# Patient Record
Sex: MALE | Race: WHITE | NOT HISPANIC OR LATINO | Employment: FULL TIME | URBAN - METROPOLITAN AREA
[De-identification: names, ages, dates, MRNs, and addresses within clinical notes are randomized per-mention and may not be internally consistent; named-entity substitution may affect disease eponyms.]

---

## 2019-09-11 ENCOUNTER — APPOINTMENT (EMERGENCY)
Dept: ULTRASOUND IMAGING | Facility: HOSPITAL | Age: 59
End: 2019-09-11
Payer: COMMERCIAL

## 2019-09-11 ENCOUNTER — HOSPITAL ENCOUNTER (INPATIENT)
Facility: HOSPITAL | Age: 59
LOS: 2 days | Discharge: HOME | DRG: 300 | End: 2019-09-13
Attending: INTERNAL MEDICINE | Admitting: INTERNAL MEDICINE
Payer: COMMERCIAL

## 2019-09-11 ENCOUNTER — OFFICE VISIT (OUTPATIENT)
Dept: URGENT CARE | Facility: CLINIC | Age: 59
End: 2019-09-11
Payer: COMMERCIAL

## 2019-09-11 ENCOUNTER — HOSPITAL ENCOUNTER (EMERGENCY)
Facility: HOSPITAL | Age: 59
End: 2019-09-11
Attending: EMERGENCY MEDICINE | Admitting: EMERGENCY MEDICINE
Payer: COMMERCIAL

## 2019-09-11 VITALS
BODY MASS INDEX: 30.61 KG/M2 | RESPIRATION RATE: 18 BRPM | TEMPERATURE: 97.5 F | WEIGHT: 226 LBS | DIASTOLIC BLOOD PRESSURE: 110 MMHG | SYSTOLIC BLOOD PRESSURE: 202 MMHG | OXYGEN SATURATION: 98 % | HEIGHT: 72 IN | HEART RATE: 96 BPM

## 2019-09-11 VITALS
HEART RATE: 89 BPM | OXYGEN SATURATION: 96 % | RESPIRATION RATE: 23 BRPM | TEMPERATURE: 98.2 F | SYSTOLIC BLOOD PRESSURE: 185 MMHG | DIASTOLIC BLOOD PRESSURE: 88 MMHG

## 2019-09-11 DIAGNOSIS — R03.0 ELEVATED BLOOD PRESSURE READING: ICD-10-CM

## 2019-09-11 DIAGNOSIS — E11.9 DIABETES MELLITUS (HCC): ICD-10-CM

## 2019-09-11 DIAGNOSIS — I82.409 DVT (DEEP VENOUS THROMBOSIS) (HCC): Primary | ICD-10-CM

## 2019-09-11 DIAGNOSIS — L03.115 CELLULITIS OF RIGHT LEG: ICD-10-CM

## 2019-09-11 DIAGNOSIS — M79.89 LEG SWELLING: Primary | ICD-10-CM

## 2019-09-11 DIAGNOSIS — I10 ESSENTIAL HYPERTENSION: ICD-10-CM

## 2019-09-11 DIAGNOSIS — E78.5 HYPERLIPIDEMIA, UNSPECIFIED HYPERLIPIDEMIA TYPE: ICD-10-CM

## 2019-09-11 PROBLEM — R73.09 ELEVATED GLUCOSE: Status: ACTIVE | Noted: 2019-09-11

## 2019-09-11 LAB
ANION GAP SERPL CALCULATED.3IONS-SCNC: 8 MMOL/L (ref 4–13)
APTT PPP: 27 SECONDS (ref 23–37)
APTT PPP: 28 SECONDS (ref 23–37)
ATRIAL RATE: 87 BPM
BASOPHILS # BLD AUTO: 0.05 THOUSANDS/ΜL (ref 0–0.1)
BASOPHILS NFR BLD AUTO: 1 % (ref 0–1)
BUN SERPL-MCNC: 18 MG/DL (ref 5–25)
CALCIUM SERPL-MCNC: 9.1 MG/DL (ref 8.3–10.1)
CHLORIDE SERPL-SCNC: 101 MMOL/L (ref 100–108)
CHOLEST SERPL-MCNC: 186 MG/DL (ref 50–200)
CO2 SERPL-SCNC: 26 MMOL/L (ref 21–32)
CREAT SERPL-MCNC: 0.82 MG/DL (ref 0.6–1.3)
EOSINOPHIL # BLD AUTO: 0.2 THOUSAND/ΜL (ref 0–0.61)
EOSINOPHIL NFR BLD AUTO: 3 % (ref 0–6)
ERYTHROCYTE [DISTWIDTH] IN BLOOD BY AUTOMATED COUNT: 12.2 % (ref 11.6–15.1)
EST. AVERAGE GLUCOSE BLD GHB EST-MCNC: 280 MG/DL
GFR SERPL CREATININE-BSD FRML MDRD: 97 ML/MIN/1.73SQ M
GLUCOSE SERPL-MCNC: 248 MG/DL (ref 65–140)
HBA1C MFR BLD: 11.4 % (ref 4.2–6.3)
HCT VFR BLD AUTO: 42.5 % (ref 36.5–49.3)
HDLC SERPL-MCNC: 46 MG/DL (ref 40–60)
HGB BLD-MCNC: 14.1 G/DL (ref 12–17)
IMM GRANULOCYTES # BLD AUTO: 0.02 THOUSAND/UL (ref 0–0.2)
IMM GRANULOCYTES NFR BLD AUTO: 0 % (ref 0–2)
INR PPP: 1.02 (ref 0.84–1.19)
LDLC SERPL CALC-MCNC: 105 MG/DL (ref 0–100)
LYMPHOCYTES # BLD AUTO: 1.65 THOUSANDS/ΜL (ref 0.6–4.47)
LYMPHOCYTES NFR BLD AUTO: 26 % (ref 14–44)
MCH RBC QN AUTO: 28.5 PG (ref 26.8–34.3)
MCHC RBC AUTO-ENTMCNC: 33.2 G/DL (ref 31.4–37.4)
MCV RBC AUTO: 86 FL (ref 82–98)
MONOCYTES # BLD AUTO: 0.54 THOUSAND/ΜL (ref 0.17–1.22)
MONOCYTES NFR BLD AUTO: 9 % (ref 4–12)
NEUTROPHILS # BLD AUTO: 3.8 THOUSANDS/ΜL (ref 1.85–7.62)
NEUTS SEG NFR BLD AUTO: 61 % (ref 43–75)
NONHDLC SERPL-MCNC: 140 MG/DL
NRBC BLD AUTO-RTO: 0 /100 WBCS
P AXIS: 62 DEGREES
PLATELET # BLD AUTO: 324 THOUSANDS/UL (ref 149–390)
PMV BLD AUTO: 10.2 FL (ref 8.9–12.7)
POTASSIUM SERPL-SCNC: 4.1 MMOL/L (ref 3.5–5.3)
PR INTERVAL: 144 MS
PROTHROMBIN TIME: 13.4 SECONDS (ref 11.6–14.5)
QRS AXIS: -43 DEGREES
QRSD INTERVAL: 96 MS
QT INTERVAL: 362 MS
QTC INTERVAL: 435 MS
RBC # BLD AUTO: 4.95 MILLION/UL (ref 3.88–5.62)
SODIUM SERPL-SCNC: 135 MMOL/L (ref 136–145)
T WAVE AXIS: 40 DEGREES
TRIGL SERPL-MCNC: 175 MG/DL
TSH SERPL DL<=0.05 MIU/L-ACNC: 0.96 UIU/ML (ref 0.36–3.74)
VENTRICULAR RATE: 87 BPM
WBC # BLD AUTO: 6.26 THOUSAND/UL (ref 4.31–10.16)

## 2019-09-11 PROCEDURE — 96366 THER/PROPH/DIAG IV INF ADDON: CPT

## 2019-09-11 PROCEDURE — NC001 PR NO CHARGE: Performed by: INTERNAL MEDICINE

## 2019-09-11 PROCEDURE — 85610 PROTHROMBIN TIME: CPT | Performed by: EMERGENCY MEDICINE

## 2019-09-11 PROCEDURE — 85025 COMPLETE CBC W/AUTO DIFF WBC: CPT | Performed by: EMERGENCY MEDICINE

## 2019-09-11 PROCEDURE — 85730 THROMBOPLASTIN TIME PARTIAL: CPT | Performed by: STUDENT IN AN ORGANIZED HEALTH CARE EDUCATION/TRAINING PROGRAM

## 2019-09-11 PROCEDURE — 36415 COLL VENOUS BLD VENIPUNCTURE: CPT | Performed by: EMERGENCY MEDICINE

## 2019-09-11 PROCEDURE — 96365 THER/PROPH/DIAG IV INF INIT: CPT

## 2019-09-11 PROCEDURE — 93970 EXTREMITY STUDY: CPT

## 2019-09-11 PROCEDURE — 85730 THROMBOPLASTIN TIME PARTIAL: CPT | Performed by: EMERGENCY MEDICINE

## 2019-09-11 PROCEDURE — 96376 TX/PRO/DX INJ SAME DRUG ADON: CPT

## 2019-09-11 PROCEDURE — 99285 EMERGENCY DEPT VISIT HI MDM: CPT

## 2019-09-11 PROCEDURE — G0382 LEV 3 HOSP TYPE B ED VISIT: HCPCS | Performed by: PHYSICIAN ASSISTANT

## 2019-09-11 PROCEDURE — 83036 HEMOGLOBIN GLYCOSYLATED A1C: CPT | Performed by: STUDENT IN AN ORGANIZED HEALTH CARE EDUCATION/TRAINING PROGRAM

## 2019-09-11 PROCEDURE — 93010 ELECTROCARDIOGRAM REPORT: CPT | Performed by: INTERNAL MEDICINE

## 2019-09-11 PROCEDURE — 80061 LIPID PANEL: CPT | Performed by: STUDENT IN AN ORGANIZED HEALTH CARE EDUCATION/TRAINING PROGRAM

## 2019-09-11 PROCEDURE — 99285 EMERGENCY DEPT VISIT HI MDM: CPT | Performed by: EMERGENCY MEDICINE

## 2019-09-11 PROCEDURE — 93005 ELECTROCARDIOGRAM TRACING: CPT

## 2019-09-11 PROCEDURE — 93970 EXTREMITY STUDY: CPT | Performed by: SURGERY

## 2019-09-11 PROCEDURE — 80048 BASIC METABOLIC PNL TOTAL CA: CPT | Performed by: EMERGENCY MEDICINE

## 2019-09-11 PROCEDURE — 84443 ASSAY THYROID STIM HORMONE: CPT | Performed by: STUDENT IN AN ORGANIZED HEALTH CARE EDUCATION/TRAINING PROGRAM

## 2019-09-11 RX ORDER — HEPARIN SODIUM 1000 [USP'U]/ML
4000 INJECTION, SOLUTION INTRAVENOUS; SUBCUTANEOUS AS NEEDED
Status: DISCONTINUED | OUTPATIENT
Start: 2019-09-11 | End: 2019-09-11

## 2019-09-11 RX ORDER — HEPARIN SODIUM 1000 [USP'U]/ML
8000 INJECTION, SOLUTION INTRAVENOUS; SUBCUTANEOUS AS NEEDED
Status: CANCELLED | OUTPATIENT
Start: 2019-09-11

## 2019-09-11 RX ORDER — HEPARIN SODIUM 10000 [USP'U]/100ML
3-30 INJECTION, SOLUTION INTRAVENOUS
Status: DISCONTINUED | OUTPATIENT
Start: 2019-09-11 | End: 2019-09-11

## 2019-09-11 RX ORDER — HEPARIN SODIUM 1000 [USP'U]/ML
8000 INJECTION, SOLUTION INTRAVENOUS; SUBCUTANEOUS AS NEEDED
Status: DISCONTINUED | OUTPATIENT
Start: 2019-09-11 | End: 2019-09-11

## 2019-09-11 RX ORDER — HEPARIN SODIUM 10000 [USP'U]/100ML
3-30 INJECTION, SOLUTION INTRAVENOUS
Status: CANCELLED | OUTPATIENT
Start: 2019-09-11

## 2019-09-11 RX ORDER — CEFAZOLIN SODIUM 2 G/50ML
2000 SOLUTION INTRAVENOUS EVERY 8 HOURS
Status: DISCONTINUED | OUTPATIENT
Start: 2019-09-11 | End: 2019-09-12

## 2019-09-11 RX ORDER — HEPARIN SODIUM 1000 [USP'U]/ML
4000 INJECTION, SOLUTION INTRAVENOUS; SUBCUTANEOUS AS NEEDED
Status: CANCELLED | OUTPATIENT
Start: 2019-09-11

## 2019-09-11 RX ORDER — CEPHALEXIN 250 MG/1
500 CAPSULE ORAL ONCE
Status: COMPLETED | OUTPATIENT
Start: 2019-09-11 | End: 2019-09-11

## 2019-09-11 RX ORDER — HEPARIN SODIUM 1000 [USP'U]/ML
8000 INJECTION, SOLUTION INTRAVENOUS; SUBCUTANEOUS AS NEEDED
Status: DISCONTINUED | OUTPATIENT
Start: 2019-09-11 | End: 2019-09-11 | Stop reason: HOSPADM

## 2019-09-11 RX ORDER — HEPARIN SODIUM 1000 [USP'U]/ML
4000 INJECTION, SOLUTION INTRAVENOUS; SUBCUTANEOUS AS NEEDED
Status: DISCONTINUED | OUTPATIENT
Start: 2019-09-11 | End: 2019-09-11 | Stop reason: HOSPADM

## 2019-09-11 RX ORDER — HEPARIN SODIUM 10000 [USP'U]/100ML
3-30 INJECTION, SOLUTION INTRAVENOUS
Status: DISCONTINUED | OUTPATIENT
Start: 2019-09-11 | End: 2019-09-11 | Stop reason: HOSPADM

## 2019-09-11 RX ORDER — HEPARIN SODIUM 1000 [USP'U]/ML
8000 INJECTION, SOLUTION INTRAVENOUS; SUBCUTANEOUS ONCE
Status: DISCONTINUED | OUTPATIENT
Start: 2019-09-11 | End: 2019-09-11

## 2019-09-11 RX ORDER — HEPARIN SODIUM 1000 [USP'U]/ML
8000 INJECTION, SOLUTION INTRAVENOUS; SUBCUTANEOUS ONCE
Status: COMPLETED | OUTPATIENT
Start: 2019-09-11 | End: 2019-09-11

## 2019-09-11 RX ORDER — HEPARIN SODIUM 1000 [USP'U]/ML
4000 INJECTION, SOLUTION INTRAVENOUS; SUBCUTANEOUS AS NEEDED
Status: DISCONTINUED | OUTPATIENT
Start: 2019-09-11 | End: 2019-09-13

## 2019-09-11 RX ORDER — HEPARIN SODIUM 10000 [USP'U]/100ML
3-30 INJECTION, SOLUTION INTRAVENOUS
Status: DISCONTINUED | OUTPATIENT
Start: 2019-09-11 | End: 2019-09-13

## 2019-09-11 RX ADMIN — HEPARIN SODIUM 8000 UNITS: 1000 INJECTION INTRAVENOUS; SUBCUTANEOUS at 12:58

## 2019-09-11 RX ADMIN — HEPARIN SODIUM AND DEXTROSE 18 UNITS/KG/HR: 10000; 5 INJECTION INTRAVENOUS at 13:08

## 2019-09-11 RX ADMIN — HEPARIN SODIUM 18 UNITS/KG/HR: 10000 INJECTION, SOLUTION INTRAVENOUS at 21:15

## 2019-09-11 RX ADMIN — CEFAZOLIN SODIUM 2000 MG: 2 SOLUTION INTRAVENOUS at 21:14

## 2019-09-11 RX ADMIN — CEPHALEXIN 500 MG: 250 CAPSULE ORAL at 11:06

## 2019-09-11 RX ADMIN — SODIUM CHLORIDE 1000 ML: 0.9 INJECTION, SOLUTION INTRAVENOUS at 14:51

## 2019-09-11 NOTE — EMTALA/ACUTE CARE TRANSFER
[address] [facility] [address]  [address]  [facility] [address]  Dept: [phone]      EMTALA TRANSFER CONSENT    NAME Dafne Davila                                         1960                              MRN [id number]    I have been informed of my rights regarding examination, treatment, and transfer   by Dr Mariusz Mcgarry MD    Benefits: Specialized equipment and/or services available at the receiving facility (Include comment)________________________(Need for vascular surgery and interventional radiology evaluation)    Risks: Potential for delay in receiving treatment, Potential deterioration of medical condition, Loss of IV, Possible worsening of condition or death during transfer, Increased discomfort during transfer      Transfer Request   I acknowledge that my medical condition has been evaluated and explained to me by the emergency department physician or other qualified medical person and/or my attending physician who has recommended and offered to me further medical examination and treatment  I understand the Hospital's obligation with respect to the treatment and stabilization of my emergency medical condition  I nevertheless request to be transferred  I release the Hospital, the doctor, and any other persons caring for me from all responsibility or liability for any injury or ill effects that may result from my transfer and agree to accept all responsibility for the consequences of my choice to transfer, rather than receive stabilizing treatment at the Hospital  I understand that because the transfer is my request, my insurance may not provide reimbursement for the services  The Hospital will assist and direct me and my family in how to make arrangements for transfer, but the hospital is not liable for any fees charged by the transport service    In spite of this understanding, I refuse to consent to further medical examination and treatment which has been offered to me, and request transfer to [address] Name, [address] : [address]  I authorize the performance of emergency medical procedures and treatments upon me in both transit and upon arrival at the receiving facility  Additionally, I authorize the release of any and all medical records to the receiving facility and request they be transported with me, if possible  I authorize the performance of emergency medical procedures and treatments upon me in both transit and upon arrival at the receiving facility  Additionally, I authorize the release of any and all medical records to the receiving facility and request they be transported with me, if possible  I understand that the safest mode of transportation during a medical emergency is an ambulance and that the Hospital advocates the use of this mode of transport  Risks of traveling to the receiving facility by car, including absence of medical control, life sustaining equipment, such as oxygen, and medical personnel has been explained to me and I fully understand them  (THERESE CORRECT BOX BELOW)  [  ]  I consent to the stated transfer and to be transported by ambulance/helicopter  [  ]  I consent to the stated transfer, but refuse transportation by ambulance and accept full responsibility for my transportation by car  I understand the risks of non-ambulance transfers and I exonerate the Hospital and its staff from any deterioration in my condition that results from this refusal     X___________________________________________    DATE  19  TIME________  Signature of patient or legally responsible individual signing on patient behalf           RELATIONSHIP TO PATIENT_________________________          Provider Certification    NAME [id number] 1960                              MRN [id number]    A medical screening exam was performed on the above named patient    Based on the examination:    Condition Necessitating Transfer The encounter diagnosis was DVT (deep venous thrombosis) ([facility] [address] )  Patient Condition: The patient has been stabilized such that within reasonable medical probability, no material deterioration of the patient condition or the condition of the unborn child(raudel) is likely to result from the transfer    Reason for Transfer: Level of Care needed not available at this facility    Transfer Requirements: [address]   · Space available and qualified personnel available for treatment as acknowledged by    · Agreed to accept transfer and to provide appropriate medical treatment as acknowledged by       Dr Natalie Weeks  · Appropriate medical records of the examination and treatment of the patient are provided at the time of transfer   [address] _______  · Transfer will be performed by qualified personnel from    and appropriate transfer equipment as required, including the use of necessary and appropriate life support measures      Provider Certification: I have examined the patient and explained the following risks and benefits of being transferred/refusing transfer to the patient/family:  General risk, such as traffic hazards, adverse weather conditions, rough terrain or turbulence, possible failure of equipment (including vehicle or aircraft), or consequences of actions of persons outside the control of the transport personnel, Unanticipated needs of medical equipment and personnel during transport, Risk of worsening condition      Based on these reasonable risks and benefits to the patient and/or the unborn child(raudel), and based upon the information available at the time of the patients examination, I certify that the medical benefits reasonably to be expected from the provision of appropriate medical treatments at another medical facility outweigh the increasing risks, if any, to the individuals medical condition, and in the case of labor to the unborn child, from effecting the transfer      X____________________________________________ DATE 09/11/19        TIME_______      ORIGINAL - SEND TO MEDICAL RECORDS   COPY - SEND WITH PATIENT DURING TRANSFER

## 2019-09-11 NOTE — ED PROVIDER NOTES
History  Chief Complaint   Patient presents with    Leg Swelling     Pt sent via urgent care for r/o DVT  Pt presents with right leg swelling, wound, and petichea  Pt reports recont travel to [facility]  HPI     28-year-old previously healthy male presenting for evaluation of swelling in his right lower extremity  Patient states that he sustained a wound to his right anterior shin 3 weeks ago while mowing the lawn  Was prescribed a Z-Sammy and an urgent care, he took this but states that the wound has remained  Reports a mild area of erythema surrounding it, with some small locations of erythema to the right posterior leg  He traveled to [facility] last week, return to week ago, then 3 days later developed swelling in his right lower extremity below the knee  No history of DVT or PE  Denies chest pain or shortness of breath  Swelling is nonpainful  Denies fevers or chills  No drainage from the wound to his leg  Additionally, patient states his blood pressure is elevated to 209/95 here today  He denies headache, vision changes, decreased urine output, chest pain, shortness of breath  Dizziness  No history of hypertension  None       History reviewed  No pertinent past medical history  History reviewed  No pertinent surgical history  History reviewed  No pertinent family history  I have reviewed and agree with the history as documented  Social History     Tobacco Use    Smoking status: Former Smoker    Smokeless tobacco: Never Used   Substance Use Topics    Alcohol use: Not Currently    Drug use: Not Currently        Review of Systems   Constitutional: Negative for chills and fever  HENT: Negative for congestion  Eyes: Negative for visual disturbance  Respiratory: Negative for cough and shortness of breath  Cardiovascular: Positive for leg swelling  Negative for chest pain  Gastrointestinal: Negative for abdominal pain, diarrhea, nausea and vomiting     Genitourinary: Negative for decreased urine volume, difficulty urinating, dysuria and frequency  Musculoskeletal: Negative for arthralgias, back pain, neck pain and neck stiffness  Skin: Positive for wound  Negative for rash  Neurological: Negative for weakness, numbness and headaches  Psychiatric/Behavioral: Negative for agitation, behavioral problems and confusion  Physical Exam  Physical Exam   Constitutional: He is oriented to person, place, and time  He appears well-developed and well-nourished  No distress  HENT:   Head: Normocephalic and atraumatic  Right Ear: External ear normal    Left Ear: External ear normal    Nose: Nose normal    Mouth/Throat: Oropharynx is clear and moist    Eyes: Conjunctivae are normal    Neck: Normal range of motion  Neck supple  Cardiovascular: Normal rate, regular rhythm, normal heart sounds and intact distal pulses  Exam reveals no gallop and no friction rub  No murmur heard  Pulmonary/Chest: Effort normal and breath sounds normal  No respiratory distress  He has no wheezes  He has no rales  Abdominal: Soft  Bowel sounds are normal  He exhibits no distension  There is no tenderness  There is no guarding  Musculoskeletal: Normal range of motion  He exhibits edema (2+ non-pitting edema to the RLE below the knee  No associated TTP)  He exhibits no deformity  Neurological: He is alert and oriented to person, place, and time  He exhibits normal muscle tone  Skin: Skin is warm and dry  He is not diaphoretic  Scab to the R anterior shin, with quarter-sized area of surrounding erythema   Small raised erythematous lesions to the R posterior thigh, non-tender       Vital Signs  ED Triage Vitals [09/11/19 1009]   Temperature Pulse Respirations Blood Pressure SpO2   98 2 °F (36 8 °C) 104 19 (!) 209/95 96 %      Temp Source Heart Rate Source Patient Position - Orthostatic VS BP Location FiO2 (%)   Oral Monitor Lying Left arm --      Pain Score       No Pain           Vitals:    09/11/19 1309 09/11/19 1430 09/11/19 1730 09/11/19 1800   BP: (!) 178/82 162/81 162/80 (!) 185/88   Pulse: 85 80 80 89   Patient Position - Orthostatic VS: Sitting Sitting Sitting Sitting         Visual Acuity      ED Medications  Medications   heparin (porcine) 25,000 units in 250 mL infusion (premix) (18 Units/kg/hr × 100 kg (Order-Specific) Intravenous New Bag 9/11/19 1308)   heparin (porcine) injection 8,000 Units (has no administration in time range)   heparin (porcine) injection 4,000 Units (has no administration in time range)   cephalexin (KEFLEX) capsule 500 mg (500 mg Oral Given 9/11/19 1106)   heparin (porcine) injection 8,000 Units (8,000 Units Intravenous Given 9/11/19 1258)   sodium chloride 0 9 % bolus 1,000 mL (1,000 mL Intravenous New Bag 9/11/19 1451)       Diagnostic Studies  Results Reviewed     Procedure Component Value Units Date/Time    APTT [[id number]]  (Normal) Collected:  09/11/19 1143    Lab Status:  Final result Specimen:  Blood from Arm, Right Updated:  09/11/19 1255     PTT 28 seconds     APTT [[id number]]     Lab Status:  No result Specimen:  Blood     Protime-INR [[id number]]  (Normal) Collected:  09/11/19 1143    Lab Status:  Final result Specimen:  Blood from Arm, Right Updated:  09/11/19 1200     Protime 13 4 seconds      INR 4 37    Basic metabolic panel [[id number]]  (Abnormal) Collected:  09/11/19 1109    Lab Status:  Final result Specimen:  Blood from Arm, Left Updated:  09/11/19 1126     Sodium 135 mmol/L      Potassium 4 1 mmol/L      Chloride 101 mmol/L      CO2 26 mmol/L      ANION GAP 8 mmol/L      BUN 18 mg/dL      Creatinine 0 82 mg/dL      Glucose 248 mg/dL      Calcium 9 1 mg/dL      eGFR 97 ml/min/1 73sq m     Narrative:       [place] guidelines for Chronic Kidney Disease (CKD):     Stage 1 with normal or high GFR (GFR > 90 mL/min/1 73 square meters)    Stage 2 Mild CKD (GFR = 60-89 mL/min/1 73 square meters)    Stage 3A Moderate CKD (GFR = 45-59 mL/min/1 73 square meters)    Stage 3B Moderate CKD (GFR = 30-44 mL/min/1 73 square meters)    Stage 4 Severe CKD (GFR = 15-29 mL/min/1 73 square meters)    Stage 5 End Stage CKD (GFR <15 mL/min/1 73 square meters)  Note: GFR calculation is accurate only with a steady state creatinine    CBC and differential [[id number]] Collected:  09/11/19 1109    Lab Status:  Final result Specimen:  Blood from Arm, Left Updated:  09/11/19 1117     WBC 6 26 Thousand/uL      RBC 4 95 Million/uL      Hemoglobin 14 1 g/dL      Hematocrit 42 5 %      MCV 86 fL      MCH 28 5 pg      MCHC 33 2 g/dL      RDW 12 2 %      MPV 10 2 fL      Platelets 699 Thousands/uL      nRBC 0 /100 WBCs      Neutrophils Relative 61 %      Immat GRANS % 0 %      Lymphocytes Relative 26 %      Monocytes Relative 9 %      Eosinophils Relative 3 %      Basophils Relative 1 %      Neutrophils Absolute 3 80 Thousands/µL      Immature Grans Absolute 0 02 Thousand/uL      Lymphocytes Absolute 1 65 Thousands/µL      Monocytes Absolute 0 54 Thousand/µL      Eosinophils Absolute 0 20 Thousand/µL      Basophils Absolute 0 05 Thousands/µL                  VAS lower limb venous duplex study, complete bilateral    (Results Pending)              Procedures  Procedures       ED Course                               MDM  Number of Diagnoses or Management Options  DVT (deep venous thrombosis) ([facility] [address] ): new and requires workup  Diagnosis management comments:   Generally well appearing  Afebrile and hemodynamically stable  Patient is hypertensive but denies symptoms that would be consistent with hypertensive crisis  No evidence of end-organ damage on labs  Patient is found to have a DVT extending from his right common femoral throughout the entirety of the right lower extremity venous system  Patient has good distal pulses  The area is not particularly tender  Case discussed with Dr Long Golden with vascular surgery    Recommends transfer to [facility] for vascular surgery and IR evaluation for likely intravascular tPA  Recommends admission to Medicine Service  Patient was started on heparin drip here  He was transferred in stable condition  Patient otherwise incidentally noted to have a blood glucose of 248  He does not have a known history of diabetes  Will likely need to be started on anti hyperglycemic agents  1 L of fluid given here  Amount and/or Complexity of Data Reviewed  Clinical lab tests: ordered and reviewed  Tests in the radiology section of CPT®: ordered and reviewed  Discuss the patient with other providers: yes    Patient Progress  Patient progress: stable         Disposition  Final diagnoses:   DVT (deep venous thrombosis) ([facility] [address] )     Time reflects when diagnosis was documented in both MDM as applicable and the Disposition within this note     Time User Action Codes Description Comment    9/11/2019  2:54 PM Lakisha Marcos Add [I82 [id number]] DVT (deep venous thrombosis) [facility])       ED Disposition     ED Disposition Condition Date/Time Comment    Transfer to Another Facility-In Network  Wed Sep 11, 2019  2:54 PM Magalis Held should be transferred out to [facility]          MD Documentation      Most Recent Value   Patient Condition  The patient has been stabilized such that within reasonable medical probability, no material deterioration of the patient condition or the condition of the unborn child(raudel) is likely to result from the transfer   Reason for Transfer  Level of Care needed not available at this facility   Benefits of Transfer  Specialized equipment and/or services available at the receiving facility (Include comment)________________________ [facility] for vascular surgery and interventional radiology evaluation]   Risks of Transfer  Potential for delay in receiving treatment, Potential deterioration of medical condition, Loss of IV, Possible worsening of condition or death during transfer, Increased discomfort during transfer   Accepting Physician  Dr Debbie Meredith Name, [facility] [place]   Sending MD Dr Estephania Wang   Provider Certification  General risk, such as traffic hazards, adverse weather conditions, rough terrain or turbulence, possible failure of equipment (including vehicle or aircraft), or consequences of actions of persons outside the control of the transport personnel, Unanticipated needs of medical equipment and personnel during transport, Risk of worsening condition      RN Documentation      Most [facility] Name, [facility] [place]   Level of Care  Basic life support      Follow-up Information    None         There are no discharge medications for this patient  No discharge procedures on file      ED Provider  Electronically Signed by           Dana Conn MD  09/11/19 9945

## 2019-09-11 NOTE — PATIENT INSTRUCTIONS
Due to elevated blood pressure, leg swelling concerned for blood clot with recent travel  Recommended emergency room evaluation  I called the ER

## 2019-09-11 NOTE — H&P
INTERNAL MEDICINE HISTORY AND PHYSICAL  [facility] [address] {SOD [id number]}    NAME: Estela Brennan  AGE: 63 y o  SEX: male  : 1960   MRN: [id number]  ENCOUNTER: [id number]    DATE: 2019  TIME: 4:25 PM    Primary Care Physician: No primary care provider on file  Admitting Provider: Cheli Mohamud MD    Chief complaint: ***    History of Present illness  This is a 71-year-old male with past medical history significant for previous tobacco abuse who presented on 2019 to [facility] secondary to increased leg swelling  Patient reportedly had a recent flight of prolonged duration and subsequently developed right lower extremity swelling after that  Patient reportedly had no history of DVT or PE, did not have any chest pain or shortness of breath  On presentation to emergency department patient was noted to have substantial swelling in his right lower extremity, as well as mild erythematous area around a wound on his right lower extremity that reportedly occurred after a   accident recently  Patient's vital signs on presentation were pulse rate of 104, respirations 19, blood pressure 209/95, O2 sat 96% and temperature 98 2°  Patient had a ultrasound of his lower extremities that was notable for extensive acute occlusive deep vein thrombus throughout the entire venous system in the common femoral vein deep femoral vein popliteal vein peroneal and posterior tibial vein  Patient had no symptoms of shortness of breath or chest pain so a CTA PE study was not obtained  Patient was started on a heparin drip as well as Keflex for his wound on his lower extremity  In [facility] was called for transfer as patient will likely need a IR vascular consult  Of note patient's glucose on BMP was 248 patient likely has new onset type 2 diabetes as well      On questioning today ***    Review of Systems        Review of Systems    Past Medical History     No past medical history on file     Past Surgical History     No past surgical history on file  Social History     Social History     Substance and Sexual Activity   Alcohol Use Not Currently     Social History     Substance and Sexual Activity   Drug Use Not Currently     Social History     Tobacco Use   Smoking Status Former Smoker   Smokeless Tobacco Never Used       Family History     No family history on file  Medications Prior to Admission     Prior to Admission medications    Not on File       Allergies     No Known Allergies    Objective     There were no vitals filed for this visit  There is no height or weight on file to calculate BMI  No intake or output data in the 24 hours ending 09/11/19 1625  Invasive Devices     Peripheral Intravenous Line            Peripheral IV 09/11/19 Left Forearm less than 1 day                Physical Exam  GENERAL: Appears well-developed and well-nourished  Appears in no acute distress   HEENT: Normocephalic and atraumatic  No scleral icterus  EOMI B/L  No oropharyngeal edema  MM moist    NECK: Neck supple with no lymphadenopathy  Trachea midline  No JVD  CARDIOVASCULAR: S1 and S2 are present  Regular rate and rhythm  No murmurs, rubs, or gallops  RESPIRATORY: CTA B/L, no rales, rhonci or wheezes  Normal respiratory expansion  ABDOMINAL: Abdomen soft, non tender, non distended   EXTREMITIES: ROM intact  No edema noted  MUSCULOSKELETAL: No joint tenderness, deformity or swelling, full range of motion without pain  NEUROLOGIC: Patient is alert and oriented to person, place, and time  No sensory or motor deficits  CN 2-12 intact  Speech fluent  SKIN: Skin is warm and dry  No rashes noted on exposed skin      PSYCHIATRIC: Normal mood and affect     Lab Results:   Results from last 7 days   Lab Units 09/11/19  1109   WBC Thousand/uL 6 26   HEMOGLOBIN g/dL 14 1   HEMATOCRIT % 42 5   PLATELETS Thousands/uL 324   NEUTROS PCT % 61   MONOS PCT % 9      Results from last 7 days   Lab Units 09/11/19  1143 09/11/19  1109   SODIUM mmol/L  --  135*   POTASSIUM mmol/L  --  4 1   CHLORIDE mmol/L  --  101   CO2 mmol/L  --  26   BUN mg/dL  --  18   CREATININE mg/dL  --  0 82   CALCIUM mg/dL  --  9 1   INR  1 02  --    PTT seconds 28  --    EGFR ml/min/1 73sq m  --  97     Results from last 7 days   Lab Units 09/11/19  1143   INR  1 02   PTT seconds 28             No results found for: PHART, IEZ9PLE, PO2ART, FKC2TYP, A5TNCEXV, BEART, SOURCE  No components found for: HIV1X2  No results found for: HAV, HEPAIGM, HEPBIGM, HEPBCAB, HBEAG, HEPCAB  No results found for: SPEP, UPEP No results found for: HGBA1C  No results found for: CHOL No results found for: HDL No results found for: LDLCALC No results found for: TRIG  No results found for: JXO2ZXAPGJDO, T3FREE, L6LHDYS, FREET4    Imaging:   No orders to display       Microbiology:      Urinalysis:      No results found for: AMPHETUR, BDZUR, COCAINEUR, OPIATEUR, PCPUR, THCUR, ETOH, ACTMNPHEN, SALICYLATE    Urine Micro:        EKG, Pathology, and Other Studies: I have personally reviewed pertinent reports  Medications Given in Emergency Department         Assessment and Plan           1  DVT - appears to be provoked given the fact that the patient had a long flight  Substantial occlusive disease throughout the left lower extremity  Will need vascular an IR consult  · Obtain vascular and IR consult  · Continue heparin drip  · Patient develops shortness of breath low threshold to CTA  · Pulses currently will need neurovascular checks  2   Hyperglycemia- likely new onset type 2 diabetes  · Will need A1c  · Start on basal bolus insulin with correction scale  3   Cellulitis - noted on lower extremity  ·  Continue cephalexin PO    4  ***  ·  ***  5  ***  ·  ***  6  ***  ·  ***  7  ***  ·  ***  8  ***  ·  ***  9  ***  ·  ***  10  ***  ·  ***  Code Status: {IMRCODE:62224::"Level 1 - Full Code"}  VTE Pharmacologic Prophylaxis: {Pharmacologic VTE Prophylaxis:145760207}   VTE Mechanical Prophylaxis: {Mechanical VTE Prophylaxis:52445}  Admission Status: {Admission Status:02052}  Expected length of stay: ***  Admission Time  I spent {Time; 15 min - 1 hour:19605} admitting the patient  This involved direct patient contact where I performed a full history and physical, reviewing previous records, and reviewing laboratory and other diagnostic studies  PLEASE NOTE:  This encounter was completed utilizing the [employer] Direct Speech Voice Recognition Software  Grammatical errors, random word insertions, pronoun errors and incomplete sentences are occasional consequences of the system due to software limitations, ambient noise and hardware issues  These may be missed by proof reading prior to affixing electronic signature  Any questions or concerns about the content, text or information contained within the body of this dictation should be directly addressed to the physician for clarification  Please do not hesitate to call me directly if you have any any questions or concerns  Dustin Serrano, DO     Internal Medicine  PGY 1

## 2019-09-11 NOTE — PROGRESS NOTES
[address] Now        NAME: Jen Diamond is a 63 y o  male  : 1960    MRN: [id number]  DATE: 2019  TIME: 9:55 AM    Assessment and Plan   Leg swelling [M79 [id number]]  1  Leg swelling  Transfer to other facility   2  Essential hypertension           Patient Instructions     Patient Instructions     Due to elevated blood pressure, leg swelling concerned for blood clot with recent travel  Recommended emergency room evaluation  I called the ER  Follow up with PCP in 3-5 days  Proceed to  ER if symptoms worsen  Chief Complaint     Chief Complaint   Patient presents with    Wound Infection     x 3 weeks,  accident, 2 wounds right leg    Leg Swelling     right leg x 3 days    Rash     red rounds spots x 1 day, see note         History of Present Illness         60-year-old male complains of right leg swelling and pain and rash for 1 week  He says 1 week ago he was in [place] and he had some gravel shoot up to his shin causing 2 small wounds  He has some redness and rash in the back  Swelling  Some pain in the leg  No fever  Review of Systems   Review of Systems   Constitutional: Negative  HENT: Negative  Eyes: Negative  Respiratory: Negative for cough and shortness of breath  Cardiovascular: Negative for chest pain  Gastrointestinal: Negative  Skin: Positive for rash  Current Medications     No current outpatient medications on file  Current Allergies     Allergies as of 2019    (No Known Allergies)            The following portions of the patient's history were reviewed and updated as appropriate: allergies, current medications, past family history, past medical history, past social history, past surgical history and problem list      History reviewed  No pertinent past medical history  History reviewed  No pertinent surgical history  No family history on file  Medications have been verified          Objective BP (!) 202/110 (BP Location: Left arm, Patient Position: Sitting, Cuff Size: Standard)   Pulse 96   Temp 97 5 °F (36 4 °C) (Temporal)   Resp 18   Ht 6' (1 829 m)   Wt 103 kg (226 lb)   SpO2 98%   BMI 30 65 kg/m²        Physical Exam     Physical Exam   Constitutional: He appears well-developed and well-nourished  No distress  HENT:   Head: Normocephalic and atraumatic  Right Ear: External ear normal    Left Ear: External ear normal    Mouth/Throat: Oropharynx is clear and moist    Eyes: Pupils are equal, round, and reactive to light  Conjunctivae and EOM are normal    Cardiovascular: Normal rate and regular rhythm  Pulmonary/Chest: Effort normal and breath sounds normal    Skin:     Right lower leg shin with 2 1 cm discoid lesions  These have some granulation tissue  There are red petechiae on the back of the right leg  He has 2 small umbilicated 3-4 mm lesions on the right distal thigh  Right calf is moderately swollen and indurated  Nontender  Negative Homans  No erythema surrounding the wounds

## 2019-09-11 NOTE — PROGRESS NOTES
Pt  States that he had a  accident 3 weeks ago and ended up with 2 open wounds on his right leg that became infected  Pt  States that he was over in [place] and was put on a z-pack and finished about 7 days ago  Pt  States that his leg started to swell 3 days ago and a rash appeared

## 2019-09-12 PROBLEM — E11.9 DIABETES MELLITUS (HCC): Status: ACTIVE | Noted: 2019-09-12

## 2019-09-12 PROBLEM — E78.5 HYPERLIPIDEMIA: Status: ACTIVE | Noted: 2019-09-12

## 2019-09-12 LAB
ANION GAP SERPL CALCULATED.3IONS-SCNC: 7 MMOL/L (ref 4–13)
APTT PPP: 54 SECONDS (ref 23–37)
APTT PPP: 59 SECONDS (ref 23–37)
APTT PPP: 66 SECONDS (ref 23–37)
ATRIAL RATE: 81 BPM
BUN SERPL-MCNC: 10 MG/DL (ref 5–25)
CALCIUM SERPL-MCNC: 9 MG/DL (ref 8.3–10.1)
CHLORIDE SERPL-SCNC: 110 MMOL/L (ref 100–108)
CO2 SERPL-SCNC: 23 MMOL/L (ref 21–32)
CREAT SERPL-MCNC: 0.76 MG/DL (ref 0.6–1.3)
ERYTHROCYTE [DISTWIDTH] IN BLOOD BY AUTOMATED COUNT: 12.2 % (ref 11.6–15.1)
GFR SERPL CREATININE-BSD FRML MDRD: 101 ML/MIN/1.73SQ M
GLUCOSE SERPL-MCNC: 159 MG/DL (ref 65–140)
GLUCOSE SERPL-MCNC: 164 MG/DL (ref 65–140)
GLUCOSE SERPL-MCNC: 218 MG/DL (ref 65–140)
HCT VFR BLD AUTO: 40.7 % (ref 36.5–49.3)
HGB BLD-MCNC: 13.5 G/DL (ref 12–17)
MCH RBC QN AUTO: 28.5 PG (ref 26.8–34.3)
MCHC RBC AUTO-ENTMCNC: 33.2 G/DL (ref 31.4–37.4)
MCV RBC AUTO: 86 FL (ref 82–98)
P AXIS: 55 DEGREES
PLATELET # BLD AUTO: 311 THOUSANDS/UL (ref 149–390)
PMV BLD AUTO: 10.6 FL (ref 8.9–12.7)
POTASSIUM SERPL-SCNC: 3.9 MMOL/L (ref 3.5–5.3)
PR INTERVAL: 148 MS
QRS AXIS: -40 DEGREES
QRSD INTERVAL: 92 MS
QT INTERVAL: 370 MS
QTC INTERVAL: 429 MS
RBC # BLD AUTO: 4.73 MILLION/UL (ref 3.88–5.62)
SODIUM SERPL-SCNC: 140 MMOL/L (ref 136–145)
T WAVE AXIS: 24 DEGREES
VENTRICULAR RATE: 81 BPM
WBC # BLD AUTO: 7.98 THOUSAND/UL (ref 4.31–10.16)

## 2019-09-12 PROCEDURE — 82948 REAGENT STRIP/BLOOD GLUCOSE: CPT

## 2019-09-12 PROCEDURE — 99223 1ST HOSP IP/OBS HIGH 75: CPT | Performed by: INTERNAL MEDICINE

## 2019-09-12 PROCEDURE — 85730 THROMBOPLASTIN TIME PARTIAL: CPT | Performed by: INTERNAL MEDICINE

## 2019-09-12 PROCEDURE — 99221 1ST HOSP IP/OBS SF/LOW 40: CPT | Performed by: SURGERY

## 2019-09-12 PROCEDURE — 93010 ELECTROCARDIOGRAM REPORT: CPT | Performed by: INTERNAL MEDICINE

## 2019-09-12 PROCEDURE — 80048 BASIC METABOLIC PNL TOTAL CA: CPT | Performed by: INTERNAL MEDICINE

## 2019-09-12 PROCEDURE — 85027 COMPLETE CBC AUTOMATED: CPT | Performed by: INTERNAL MEDICINE

## 2019-09-12 PROCEDURE — NC001 PR NO CHARGE: Performed by: INTERNAL MEDICINE

## 2019-09-12 RX ORDER — LISINOPRIL 10 MG/1
10 TABLET ORAL DAILY
Status: DISCONTINUED | OUTPATIENT
Start: 2019-09-12 | End: 2019-09-12

## 2019-09-12 RX ORDER — INSULIN GLARGINE 100 [IU]/ML
20 INJECTION, SOLUTION SUBCUTANEOUS
Qty: 600 UNITS | Refills: 0 | Status: SHIPPED | OUTPATIENT
Start: 2019-09-12 | End: 2019-09-13 | Stop reason: HOSPADM

## 2019-09-12 RX ORDER — ATORVASTATIN CALCIUM 20 MG/1
20 TABLET, FILM COATED ORAL
Status: DISCONTINUED | OUTPATIENT
Start: 2019-09-12 | End: 2019-09-12

## 2019-09-12 RX ORDER — ATORVASTATIN CALCIUM 40 MG/1
40 TABLET, FILM COATED ORAL
Status: DISCONTINUED | OUTPATIENT
Start: 2019-09-12 | End: 2019-09-12

## 2019-09-12 RX ORDER — CEPHALEXIN 500 MG/1
500 CAPSULE ORAL EVERY 6 HOURS SCHEDULED
Status: DISCONTINUED | OUTPATIENT
Start: 2019-09-12 | End: 2019-09-13 | Stop reason: HOSPADM

## 2019-09-12 RX ORDER — INSULIN GLARGINE 100 [IU]/ML
20 INJECTION, SOLUTION SUBCUTANEOUS
Status: DISCONTINUED | OUTPATIENT
Start: 2019-09-12 | End: 2019-09-13 | Stop reason: HOSPADM

## 2019-09-12 RX ADMIN — INSULIN LISPRO 2 UNITS: 100 INJECTION, SOLUTION INTRAVENOUS; SUBCUTANEOUS at 17:49

## 2019-09-12 RX ADMIN — HEPARIN SODIUM 20 UNITS/KG/HR: 10000 INJECTION, SOLUTION INTRAVENOUS at 08:04

## 2019-09-12 RX ADMIN — CEPHALEXIN 500 MG: 500 CAPSULE ORAL at 11:13

## 2019-09-12 RX ADMIN — INSULIN LISPRO 1 UNITS: 100 INJECTION, SOLUTION INTRAVENOUS; SUBCUTANEOUS at 21:52

## 2019-09-12 RX ADMIN — HEPARIN SODIUM 4000 UNITS: 1000 INJECTION, SOLUTION INTRAVENOUS; SUBCUTANEOUS at 03:35

## 2019-09-12 RX ADMIN — INSULIN GLARGINE 20 UNITS: 100 INJECTION, SOLUTION SUBCUTANEOUS at 21:50

## 2019-09-12 RX ADMIN — HEPARIN SODIUM 22 UNITS/KG/HR: 10000 INJECTION, SOLUTION INTRAVENOUS at 20:48

## 2019-09-12 RX ADMIN — CEPHALEXIN 500 MG: 500 CAPSULE ORAL at 17:48

## 2019-09-12 RX ADMIN — CEFAZOLIN SODIUM 2000 MG: 2 SOLUTION INTRAVENOUS at 04:43

## 2019-09-12 RX ADMIN — HEPARIN SODIUM 4000 UNITS: 1000 INJECTION, SOLUTION INTRAVENOUS; SUBCUTANEOUS at 18:04

## 2019-09-12 NOTE — NURSING NOTE
Spoke to Jazmine with [facility]  Patient does not need to be level 2 stepdown at this time; med surg telemetry is adequate  Patient is still on waiting list to receive telemetry box  Dr Salinas Said also aware that reinitiation PTT was subtherapeutic at 27  No changes to heparin gtt at this time  Will follow protocol and recheck PTT at 0300 as per protocol

## 2019-09-12 NOTE — PROGRESS NOTES
[facility] - Internal Medicine Progress Note  Unit/Bed#: PPHP 820-01 Encounter: [id number]  [facility] Team A      Salma Morrow 63 y o  male [id number]  Hospital Stay Days: 1    Assessment and Plan      Current Problem List   Principal Problem:    DVT (deep venous thrombosis) (HCC)  Active Problems:    Elevated glucose    Cellulitis of right lower extremity    Elevated blood pressure reading    Hyperlipidemia    Diabetes mellitus ([address] )    Assessment/Plan:    Deep vein thrombosis:  Acute onset right lower extremity edema/erythema following prolonged flight back from [facility]  U/S notable for extensive acute occlusion DVT throughout the entire venous system in the at the common for moral vein/deep for more of an/popliteal vein/peroneal vein/posterior tibial vein  No history of DVT/PE  Increased circumference right lower extremity  Denies any symptoms  Afebrile, oxygenating well room air     -vascular surgery recommendations; compression in dressing on right lower extremity, elevate right lower extremity, discussed with case management for Xarelto versus Eliquis   -advance diet today, ambulate as tolerated  -continue heparin drip to therapeutic range,  switching over to PO Anticoagulation  -continue to monitor vitals, clinical signs of PE    Cellulitis of right lower extremity:  Most likely due to  accident 2-3 weeks ago, trauma to right shin, patchy macular erythema, has not worsened past demarcated borders, afebrile, WBC 7 98  -continue Ancef, will switched to p o  Keflex 500 q 6h for 7 days    Diabetes mellitus:  Elevated glucose  Hemoglobin A1c 11 4  Last glucose 164, in-hospital range of 140-180   -will start Lantus 20 q h s   -will start metformin/insulin regimen on discharge with outpatient follow-up for chronic diseases    Hyperlipidemia:  Lipid panel total 186, , triglycerides 175, HDL 46, ASCVD risk 16 5%  -high-intensity statin therapy; atorvastatin 40 mg q day    Hypertension:   On admission 147/115, has improved 139/86, no home medications  -start lisinopril 10 mg q day    Disposition:  Discussion with case management for Xarelto versus Eliquis coverage through the patient's insurance  Subjective     Patient seen examined at bedside this morning  Patient has no overnight events, no complaints  Has been on heparin drip and cefazolin IV  Denies any fever/chills, shortness of breath, chest pain, dizziness, cough, nausea/vomiting/diarrhea  Objective     Vitals: Temp (24hrs), Av 2 °F (36 8 °C), Min:97 5 °F (36 4 °C), Max:98 9 °F (37 2 °C)  Current: Temperature: 98 6 °F (37 °C)  Patient Vitals for the past 24 hrs:   BP Temp Pulse Resp SpO2 Height Weight   19 0729 145/90 98 6 °F (37 °C) 75 16 96 % -- --   19 2319 139/86 98 9 °F (37 2 °C) 77 18 95 % -- --   19 1954 150/100 -- -- -- -- 5' 9" (1 753 m) 103 kg (226 lb)   19 1947 (!) 147/115 98 °F (36 7 °C) 87 18 96 % -- --    Body mass index is 33 37 kg/m²  Physical Exam:  GENERAL: NAD  HEENT:  NC/AT, PERRL, EOMI, no scleral icterus  CARDIAC:  RRR, +S1/S2, no S3/S4 heard, no m/g/r  PULMONARY:  CTA B/L, no wheezing/rales/rhonci, non-labored breathing  ABDOMEN:  Soft, NT/ND,no rebound/guarding/rigidity  Extremities:   Right calf circumference slightly larger than left, no cyanosis, or clubbing, +2 pulses bilaterally  NEUROLOGIC: Grossly intact  SKIN:  No rashes or erythema noted on exposed skin     Psych:  Right lower extremity erythema improved, does not cross lines drawn yesterday    Invasive Devices     Peripheral Intravenous Line            Peripheral IV 19 Left Forearm less than 1 day                    Labs:   Results from last 7 days   Lab Units 19  0331 19  1109   WBC Thousand/uL 7 98 6 26   HEMOGLOBIN g/dL 13 5 14 1   HEMATOCRIT % 40 7 42 5   PLATELETS Thousands/uL 311 324   NEUTROS PCT %  --  61   MONOS PCT %  --  9      Results from last 7 days   Lab Units 19  0317 19  0250 09/11/19 2104 09/11/19  1143 09/11/19  1109   SODIUM mmol/L 140  --   --   --  135*   POTASSIUM mmol/L 3 9  --   --   --  4 1   CHLORIDE mmol/L 110*  --   --   --  101   CO2 mmol/L 23  --   --   --  26   BUN mg/dL 10  --   --   --  18   CREATININE mg/dL 0 76  --   --   --  0 82   CALCIUM mg/dL 9 0  --   --   --  9 1   INR   --   --   --  1 02  --    PTT seconds  --  54* 27 28  --    EGFR ml/min/1 73sq m 101  --   --   --  97     Results from last 7 days   Lab Units 09/12/19  0251 09/11/19 2104 09/11/19  1143   INR   --   --  1 02   PTT seconds 54* 27 28             No results found for: PHART, JQG4LFZ, PO2ART, PPD2BWT, O8HTHJXE, BEART, SOURCE  No components found for: HIV1X2  No results found for: HAV, HEPAIGM, HEPBIGM, HEPBCAB, HBEAG, HEPCAB  No results found for: SPEP, UPEP   Lab Results   Component Value Date    HGBA1C 11 4 (H) 09/11/2019     No results found for: CHOL   Lab Results   Component Value Date    HDL 46 09/11/2019      Lab Results   Component Value Date    LDLCALC 105 (H) 09/11/2019      Lab Results   Component Value Date    TRIG 175 (H) 09/11/2019     No components found for: PROCAL      Micro:      Urinalysis:  No results found for: AMPHETUR, BDZUR, COCAINEUR, OPIATEUR, PCPUR, THCUR, ETOH, ACTMNPHEN, SALICYLATE       Invalid input(s): URIBILINOGEN        Intake and Outputs:  I/O       09/10 0701 - 09/11 0700 09/11 0701 - 09/12 0700    Urine (mL/kg/hr)  250    Stool  0    Total Output  250    Net  -250          Unmeasured Stool Occurrence  0 x        Nutrition:  Diet NPO; Sips with meds  Radiology Results:   No orders to display     Scheduled Medications:    cefazolin 2,000 mg Q8H     PRN MEDS:    heparin (porcine) 4,000 Units PRN     Last 24 Hour Meds: :   Medication Administration - last 24 hours from 09/11/2019 0916 to 09/12/2019 0916       Date/Time Order Dose Route Action Action by     09/12/2019 0443 ceFAZolin (ANCEF) IVPB (premix) 2,000 mg 2,000 mg Intravenous New Bag [address] 09/11/2019 2203 ceFAZolin (ANCEF) IVPB (premix) 2,000 mg 0 mg Intravenous Stopped Hector Donnelly RN     09/11/2019 2114 ceFAZolin (ANCEF) IVPB (premix) 2,000 mg 2,000 mg Intravenous New Bag Leoncio Tompkins     09/11/2019 2051 heparin (porcine) injection 8,000 Units 8,000 Units Intravenous Not Given [facility]     09/11/2019 2058 heparin (porcine) 25,000 units in 250 mL infusion (premix)   Intravenous Canceled Entry Leoncio Leda     09/12/2019 0804 heparin (porcine) 25,000 units in 250 mL infusion (premix) 20 Units/kg/hr Intravenous [address] Carlitos Jiménez RN     09/12/2019 0803 heparin (porcine) 25,000 units in 250 mL infusion (premix) 0 Units/kg/hr Intravenous Stopped Carlitos Jiménez RN     09/12/2019 0339 heparin (porcine) 25,000 units in 250 mL infusion (premix) 20 Units/kg/hr Intravenous Rate/Dose Change Leoncio Tompkins     09/11/2019 2115 heparin (porcine) 25,000 units in 250 mL infusion (premix) 18 Units/kg/hr Intravenous New Bag Leoncio Tompkins     09/12/2019 0335 heparin (porcine) injection 4,000 Units 4,000 Units Intravenous Given Leoncio Tompkins        VTE Pharmacologic Prophylaxis: Heparin  VTE Mechanical Prophylaxis: sequential compression device    PLEASE NOTE:  This encounter was completed utilizing the [employer] Direct Speech Voice Recognition Software  Grammatical errors, random word insertions, pronoun errors and incomplete sentences are occasional consequences of the system due to software limitations, ambient noise and hardware issues  These may be missed by proof reading prior to affixing electronic signature  Any questions or concerns about the content, text or information contained within the body of this dictation should be directly addressed to the physician for clarification  Please do not hesitate to call me directly if you have any any questions or concerns      DO Bri Clayton 73 Internal Medicine PGY-1

## 2019-09-12 NOTE — PROGRESS NOTES
[facility] Admission Note   Unit/Bed # @DBLINK (XOE,39158)@ Encounter: [id number]  [facility] Team A          Maria D Delatorre 63 y o  male [id number]       Patient seen and examined  Reviewed H&P per Dr Gideon Ochoa  Agree with the assessment and plan  Assessment/Plan: Principal Problem:    DVT (deep venous thrombosis) (HCC)  Active Problems:    Elevated glucose    Cellulitis of right lower extremity    Elevated blood pressure reading     67-year-old male who does not follow with PCP with subjective history of hypertension and remote history of diabetes diet controlled  Patient transferred from [facility] for provoked acute extensive occlusive DVT throughout entire common femoral vein on the right lower extremity, femoral vein, deep femoral vein, popliteal vein, peroneal vein, posterior tibial vein  Patient transferred to [facility] for vascular surgery evaluation  Patient will be restarted on heparin drip  Vascular surgery contacted and will evaluate patient  Patient with 2+ peripheral pulses in the lower extremities bilaterally  Patient also with recent onset of cellulitis  Patient received Keflex the previous hospital   Will continue patient on IV cefazolin and will demarcate area of erythema  Patient nonseptic  Patient will likely need treatment for hypertension prior to leaving hospital however will hold off on starting antihypertensive medication given possible surgical intervention  Given risk factors for coronary artery disease, will obtain A1c and lipid panel for ASCVD risk stratification  Will keep patient NPO pending vascular surgery consult      Disposition:  INPATIENT     Expected LOS: Víctor Davis MD Detail Level: Detailed Topical Clindamycin Pregnancy And Lactation Text: This medication is Pregnancy Category B and is considered safe during pregnancy. It is unknown if it is excreted in breast milk. Topical Retinoid counseling:  Patient advised to apply a pea-sized amount only at bedtime and wait 30 minutes after washing their face before applying. If too drying, patient may add a non-comedogenic moisturizer. The patient verbalized understanding of the proper use and possible adverse effects of retinoids. All of the patient's questions and concerns were addressed. Erythromycin Counseling:  I discussed with the patient the risks of erythromycin including but not limited to GI upset, allergic reaction, drug rash, diarrhea, increase in liver enzymes, and yeast infections. Spironolactone Pregnancy And Lactation Text: This medication can cause feminization of the male fetus and should be avoided during pregnancy. The active metabolite is also found in breast milk. Birth Control Pills Pregnancy And Lactation Text: This medication should be avoided if pregnant and for the first 30 days post-partum. Tetracycline Counseling: Patient counseled regarding possible photosensitivity and increased risk for sunburn. Patient instructed to avoid sunlight, if possible. When exposed to sunlight, patients should wear protective clothing, sunglasses, and sunscreen. The patient was instructed to call the office immediately if the following severe adverse effects occur:  hearing changes, easy bruising/bleeding, severe headache, or vision changes. The patient verbalized understanding of the proper use and possible adverse effects of tetracycline. All of the patient's questions and concerns were addressed. Patient understands to avoid pregnancy while on therapy due to potential birth defects. Dapsone Counseling: I discussed with the patient the risks of dapsone including but not limited to hemolytic anemia, agranulocytosis, rashes, methemoglobinemia, kidney failure, peripheral neuropathy, headaches, GI upset, and liver toxicity. Patients who start dapsone require monitoring including baseline LFTs and weekly CBCs for the first month, then every month thereafter. The patient verbalized understanding of the proper use and possible adverse effects of dapsone. All of the patient's questions and concerns were addressed. Azithromycin Pregnancy And Lactation Text: This medication is considered safe during pregnancy and is also secreted in breast milk. High Dose Vitamin A Pregnancy And Lactation Text: High dose vitamin A therapy is contraindicated during pregnancy and breast feeding. Erythromycin Pregnancy And Lactation Text: This medication is Pregnancy Category B and is considered safe during pregnancy. It is also excreted in breast milk. Include Pregnancy/Lactation Warning?: No Topical Sulfur Applications Counseling: Topical Sulfur Counseling: Patient counseled that this medication may cause skin irritation or allergic reactions. In the event of skin irritation, the patient was advised to reduce the amount of the drug applied or use it less frequently. The patient verbalized understanding of the proper use and possible adverse effects of topical sulfur application. All of the patient's questions and concerns were addressed. Topical Retinoid Pregnancy And Lactation Text: This medication is Pregnancy Category C. It is unknown if this medication is excreted in breast milk. Topical Sulfur Applications Pregnancy And Lactation Text: This medication is Pregnancy Category C and has an unknown safety profile during pregnancy. It is unknown if this topical medication is excreted in breast milk. Tazorac Counseling:  Patient advised that medication is irritating and drying. Patient may need to apply sparingly and wash off after an hour before eventually leaving it on overnight. The patient verbalized understanding of the proper use and possible adverse effects of tazorac. All of the patient's questions and concerns were addressed. Dapsone Pregnancy And Lactation Text: This medication is Pregnancy Category C and is not considered safe during pregnancy or breast feeding. Tetracycline Pregnancy And Lactation Text: This medication is Pregnancy Category D and not consider safe during pregnancy. It is also excreted in breast milk. Bactrim Counseling:  I discussed with the patient the risks of sulfa antibiotics including but not limited to GI upset, allergic reaction, drug rash, diarrhea, dizziness, photosensitivity, and yeast infections. Rarely, more serious reactions can occur including but not limited to aplastic anemia, agranulocytosis, methemoglobinemia, blood dyscrasias, liver or kidney failure, lung infiltrates or desquamative/blistering drug rashes. Minocycline Counseling: Patient advised regarding possible photosensitivity and discoloration of the teeth, skin, lips, tongue and gums. Patient instructed to avoid sunlight, if possible. When exposed to sunlight, patients should wear protective clothing, sunglasses, and sunscreen. The patient was instructed to call the office immediately if the following severe adverse effects occur:  hearing changes, easy bruising/bleeding, severe headache, or vision changes. The patient verbalized understanding of the proper use and possible adverse effects of minocycline. All of the patient's questions and concerns were addressed. Bactrim Pregnancy And Lactation Text: This medication is Pregnancy Category D and is known to cause fetal risk. It is also excreted in breast milk. Isotretinoin Counseling: Patient should get monthly blood tests, not donate blood, not drive at night if vision affected, not share medication, and not undergo elective surgery for 6 months after tx completed. Side effects reviewed, pt to contact office should one occur. Detail Level: Simple Tazorac Pregnancy And Lactation Text: This medication is not safe during pregnancy. It is unknown if this medication is excreted in breast milk. Benzoyl Peroxide Counseling: Patient counseled that medicine may cause skin irritation and bleach clothing. In the event of skin irritation, the patient was advised to reduce the amount of the drug applied or use it less frequently. The patient verbalized understanding of the proper use and possible adverse effects of benzoyl peroxide. All of the patient's questions and concerns were addressed. Doxycycline Counseling:  Patient counseled regarding possible photosensitivity and increased risk for sunburn. Patient instructed to avoid sunlight, if possible. When exposed to sunlight, patients should wear protective clothing, sunglasses, and sunscreen. The patient was instructed to call the office immediately if the following severe adverse effects occur:  hearing changes, easy bruising/bleeding, severe headache, or vision changes. The patient verbalized understanding of the proper use and possible adverse effects of doxycycline. All of the patient's questions and concerns were addressed. Doxycycline Pregnancy And Lactation Text: This medication is Pregnancy Category D and not consider safe during pregnancy. It is also excreted in breast milk but is considered safe for shorter treatment courses. Benzoyl Peroxide Pregnancy And Lactation Text: This medication is Pregnancy Category C. It is unknown if benzoyl peroxide is excreted in breast milk. Spironolactone Counseling: Patient advised regarding risks of diarrhea, abdominal pain, hyperkalemia, birth defects (for female patients), liver toxicity and renal toxicity. The patient may need blood work to monitor liver and kidney function and potassium levels while on therapy. The patient verbalized understanding of the proper use and possible adverse effects of spironolactone. All of the patient's questions and concerns were addressed. Birth Control Pills Counseling: Birth Control Pill Counseling: I discussed with the patient the potential side effects of OCPs including but not limited to increased risk of stroke, heart attack, thrombophlebitis, deep venous thrombosis, hepatic adenomas, breast changes, GI upset, headaches, and depression. The patient verbalized understanding of the proper use and possible adverse effects of OCPs. All of the patient's questions and concerns were addressed. Isotretinoin Pregnancy And Lactation Text: This medication is Pregnancy Category X and is considered extremely dangerous during pregnancy. It is unknown if it is excreted in breast milk. High Dose Vitamin A Counseling: Side effects reviewed, pt to contact office should one occur. Azithromycin Counseling:  I discussed with the patient the risks of azithromycin including but not limited to GI upset, allergic reaction, drug rash, diarrhea, and yeast infections. Topical Clindamycin Counseling: Patient counseled that this medication may cause skin irritation or allergic reactions. In the event of skin irritation, the patient was advised to reduce the amount of the drug applied or use it less frequently. The patient verbalized understanding of the proper use and possible adverse effects of clindamycin. All of the patient's questions and concerns were addressed.

## 2019-09-12 NOTE — NURSING NOTE
Viktoriya Contreras with [facility] aware that patient is not currently on telemetry because there are no boxes available at this time  Also asked to clarify level 2 stepdown orders  Awaiting clarification

## 2019-09-12 NOTE — PLAN OF CARE
Problem: PAIN - ADULT  Goal: Verbalizes/displays adequate comfort level or baseline comfort level  Description  Interventions:  - Encourage patient to monitor pain and request assistance  - Assess pain using appropriate pain scale  - Administer analgesics based on type and severity of pain and evaluate response  - Implement non-pharmacological measures as appropriate and evaluate response  - Consider cultural and social influences on pain and pain management  - Notify physician/advanced practitioner if interventions unsuccessful or patient reports new pain  Outcome: Progressing     Problem: INFECTION - ADULT  Goal: Absence or prevention of progression during hospitalization  Description  INTERVENTIONS:  - Assess and monitor for signs and symptoms of infection  - Monitor lab/diagnostic results  - Monitor all insertion sites, i e  indwelling lines, tubes, and drains  - Monitor endotracheal if appropriate and nasal secretions for changes in amount and color  - [place] appropriate cooling/warming therapies per order  - Administer medications as ordered  - Instruct and encourage patient and family to use good hand hygiene technique  - Identify and instruct in appropriate isolation precautions for identified infection/condition  Outcome: Progressing  Goal: Absence of fever/infection during neutropenic period  Description  INTERVENTIONS:  - Monitor WBC    Outcome: Progressing     Problem: SAFETY ADULT  Goal: Patient will remain free of falls  Description  INTERVENTIONS:  - Assess patient frequently for physical needs  -  Identify cognitive and physical deficits and behaviors that affect risk of falls    -  [place] fall precautions as indicated by assessment   - Educate patient/family on patient safety including physical limitations  - Instruct patient to call for assistance with activity based on assessment  - Modify environment to reduce risk of injury  - Consider OT/PT consult to assist with strengthening/mobility  Outcome: Progressing  Goal: Maintain or return to baseline ADL function  Description  INTERVENTIONS:  -  Assess patient's ability to carry out ADLs; assess patient's baseline for ADL function and identify physical deficits which impact ability to perform ADLs (bathing, care of mouth/teeth, toileting, grooming, dressing, etc )  - Assess/evaluate cause of self-care deficits   - Assess range of motion  - Assess patient's mobility; develop plan if impaired  - Assess patient's need for assistive devices and provide as appropriate  - Encourage maximum independence but intervene and supervise when necessary  - Involve family in performance of ADLs  - Assess for home care needs following discharge   - Consider OT consult to assist with ADL evaluation and planning for discharge  - Provide patient education as appropriate  Outcome: Progressing  Goal: Maintain or return mobility status to optimal level  Description  INTERVENTIONS:  - Assess patient's baseline mobility status (ambulation, transfers, stairs, etc )    - Identify cognitive and physical deficits and behaviors that affect mobility  - Identify mobility aids required to assist with transfers and/or ambulation (gait belt, sit-to-stand, lift, walker, cane, etc )  - [place] fall precautions as indicated by assessment  - Record patient progress and toleration of activity level on Mobility SBAR; progress patient to next Phase/Stage  - Instruct patient to call for assistance with activity based on assessment  - Consider rehabilitation consult to assist with strengthening/weightbearing, etc   Outcome: Progressing

## 2019-09-12 NOTE — PROGRESS NOTES
IM Medical Student Progress Note   Unit/Bed#: [facility] 820-01 Encounter: [id number]  SOD Team A      Magalis Held 63 y o  male [phone]    Hospital Stay Days: 1      Assessment/Plan: Lino Lawrence is a 62 yo M with past medical history of type 2 diabetes and 8 pack years smoking history quit in  now presenting with RLE swelling due to extensive occlusive DVT in common femoral, femoral, deep femoral, popliteal, peroneal, and posterior tibial veins    Principal Problem:    DVT (deep venous thrombosis) ([facility])  Active Problems:    Elevated glucose    Cellulitis of right lower extremity    Elevated blood pressure reading    Hyperlipidemia    Diabetes mellitus ([facility] [address] )    #DVT  - Vascular surgery consulted, no surgery recommended  - Continue hep gtt today  - Start Xarelto or Eliquis tomorrow depending on insurance coverage  - Compression stocking in place over RLE    #Elevated glucose/Diabetes mellitus  - Hba1c of 11 4  - Patient counseled on need for insulin due to uncontrolled hyperglycemia  - Start Lantus 20 u    #Cellulitis of right lower extremity  - Erythema appears reduced today per patient  Only slight erythema appreciated on exam  - D/c Ancef, start Keflex 500 mg    #Hyperlipidemia  - Discussed starting atorvastatin, however patient is refusing medication    Subjective:   Patient is resting in bed in no acute distress  He states that his right lower leg looks much less red today than before  He also states that the leg was hard due to swelling previously, but now feels soft and improved  He mentioned that the right leg wound from the lawnmower accident he sustained did not heal despite using ointments for several days  He denies any pain, fever, chills, or sweats  He states that he has no other complaints and would like to go home         Vitals: Temp (24hrs), Av 5 °F (36 9 °C), Min:98 °F (36 7 °C), Max:98 9 °F (37 2 °C)  Current: Temperature: 98 6 °F (37 °C)  Vitals:    19 09/11/19 2319 09/12/19 0729   BP: (!) 147/115 150/100 139/86 145/90   BP Location:  Right arm     Pulse: 87  77 75   Resp: 18  18 16   Temp: 98 °F (36 7 °C)  98 9 °F (37 2 °C) 98 6 °F (37 °C)   SpO2: 96%  95% 96%   Weight:  103 kg (226 lb)     Height:  5' 9" (1 753 m)      Body mass index is 33 37 kg/m²  I/O last 24 hours: In: 120 [P O :120]  Out: 250 [Urine:250]    Review of Systems   Constitution: Negative for chills, decreased appetite, diaphoresis, fever, weight gain and weight loss  Cardiovascular: Negative for chest pain  Respiratory: Negative for shortness of breath  Skin: Positive for poor wound healing  Gastrointestinal: Negative for abdominal pain, change in bowel habit, hematochezia, melena, nausea and vomiting  Genitourinary: Negative for bladder incontinence, dysuria, hematuria and hesitancy  Physical Exam: Physical Exam   Constitutional: He is oriented to person, place, and time  He appears well-developed and well-nourished  No distress  HENT:   Head: Normocephalic and atraumatic  Cardiovascular: Normal rate, regular rhythm, normal heart sounds and intact distal pulses  PT 2+ bilaterally   Pulmonary/Chest: Effort normal and breath sounds normal    Abdominal: Soft  Bowel sounds are normal  He exhibits no distension  There is no tenderness  Neurological: He is alert and oriented to person, place, and time  Skin: Skin is warm  He is not diaphoretic  There is erythema  Very mild erythema of RLE that does not extend past area marked  Wound with black scab present on anterior RLE with no purulence   Compression stocking in place       Invasive Devices     Peripheral Intravenous Line            Peripheral IV 09/11/19 Left Forearm 1 day                          Labs:   Recent Results (from the past 24 hour(s))   APTT    Collection Time: 09/11/19  9:04 PM   Result Value Ref Range    PTT 27 23 - 37 seconds   TSH, 3rd generation    Collection Time: 09/11/19  9:04 PM   Result Value Ref Range    TSH 3RD GENERATON 0 958 0 358 - 3 740 uIU/mL   Hemoglobin A1C w/ EAG Estimation    Collection Time: 09/11/19  9:04 PM   Result Value Ref Range    Hemoglobin A1C 11 4 (H) 4 2 - 6 3 %     mg/dl   Lipid panel    Collection Time: 09/11/19  9:04 PM   Result Value Ref Range    Cholesterol 186 50 - 200 mg/dL    Triglycerides 175 (H) <=150 mg/dL    HDL, Direct 46 40 - 60 mg/dL    LDL Calculated 105 (H) 0 - 100 mg/dL    Non-HDL-Chol (CHOL-HDL) 140 mg/dl   ECG 12 lead    Collection Time: 09/11/19  9:07 PM   Result Value Ref Range    Ventricular Rate 81 BPM    Atrial Rate 81 BPM    AL Interval 148 ms    QRSD Interval 92 ms    QT Interval 370 ms    QTC Interval 429 ms    P [place] 55 degrees    QRS Axis -40 degrees    T Wave Axis 24 degrees   APTT    Collection Time: 09/12/19  2:51 AM   Result Value Ref Range    PTT 54 (H) 23 - 37 seconds   Basic metabolic panel    Collection Time: 09/12/19  3:17 AM   Result Value Ref Range    Sodium 140 136 - 145 mmol/L    Potassium 3 9 3 5 - 5 3 mmol/L    Chloride 110 (H) 100 - 108 mmol/L    CO2 23 21 - 32 mmol/L    ANION GAP 7 4 - 13 mmol/L    BUN 10 5 - 25 mg/dL    Creatinine 0 76 0 60 - 1 30 mg/dL    Glucose 164 (H) 65 - 140 mg/dL    Calcium 9 0 8 3 - 10 1 mg/dL    eGFR 101 ml/min/1 73sq m   CBC (With Platelets)    Collection Time: 09/12/19  3:31 AM   Result Value Ref Range    WBC 7 98 4 31 - 10 16 Thousand/uL    RBC 4 73 3 88 - 5 62 Million/uL    Hemoglobin 13 5 12 0 - 17 0 g/dL    Hematocrit 40 7 36 5 - 49 3 %    MCV 86 82 - 98 fL    MCH 28 5 26 8 - 34 3 pg    MCHC 33 2 31 4 - 37 4 g/dL    RDW 12 2 11 6 - 15 1 %    Platelets 954 211 - 190 Thousands/uL    MPV 10 6 8 9 - 12 7 fL   APTT    Collection Time: 09/12/19 10:32 AM   Result Value Ref Range    PTT 66 (H) 23 - 37 seconds       Radiology Results: I have personally reviewed pertinent reports  Other Diagnostic Testing:   I have personally reviewed pertinent reports          Active Meds:   Current Facility-Administered Medications   Medication Dose Route Frequency    cephalexin (KEFLEX) capsule 500 mg  500 mg Oral Q6H [facility]    heparin (porcine) 25,000 units in 250 mL infusion (premix)  3-30 Units/kg/hr (Order-Specific) Intravenous Titrated    heparin (porcine) injection 4,000 Units  4,000 Units Intravenous PRN    insulin glargine (LANTUS) subcutaneous injection 20 Units 0 2 mL  20 Units Subcutaneous HS    lisinopril (ZESTRIL) tablet 10 mg  10 mg Oral Daily         VTE Pharmacologic Prophylaxis: Heparin  VTE Mechanical Prophylaxis: sequential compression device on left leg   Compression stocking on right leg    Rodriguez Grant

## 2019-09-12 NOTE — H&P
INTERNAL MEDICINE RESIDENCY ADMISSION H&P     Name: Teja Sanchez   Age & Sex: 63 y o  male   MRN: [id number]  Unit/Bed#: [facility] 820-01   Encounter: [id number]  Primary Care Provider: No primary care provider on file      Code Status: Level 1 - Full Code  Admission Status: INPATIENT   Disposition: Patient requires Med/Surg with Telemetry    ASSESSMENT/PLAN     Principal Problem:    DVT (deep venous thrombosis) (HCC)  Active Problems:    Elevated glucose    Cellulitis of right lower extremity    Elevated blood pressure reading    1) Deep venous thrombosis  · Acute onset right lower extremity edema and erythema following prolonged flight approximately 10 days ago  · Initial right lower extremity ultrasound notable for extensive acute occlusive deep vein thrombus throughout the entire venous system in the common femoral vein, deep femoral vein, popliteal vein, peroneal vein, and posterior tibial vein  · No personal history of DVT, malignancy, or thrombophilia; no known family history of DVT, malignancy, or thrombophilia  · Right lower extremity measured 17 inches in circumference, left lower extremity measured 15 inches in circumference  · Headaches, fevers, chills, weight loss, vision changes, lightheadedness, dizziness, chest pain, shortness of breath, and cough at time of examination  · Initial vital signs:  98° F, HR 87, RR 18, /115 96% O2 saturation on room air  · WBC measured 6 26 at 11:09 a m  on 09/11/2019  · Will continue NPO status (sips with meds) pending vascular surgery evaluation (consult placed)  · Ambulate patient 3 times daily; SCDs ordered  · Continue heparin drip as ordered  · Continue to monitor vital signs; monitor for clinical signs of PE  2) Cellulitis of right lower extremity  · Mild erythematous area around right lower extremity wound with areas of patchy macular erythema that reportedly occurred after recent trauma ( accident 2-3 weeks ago)  · Right lower extremity erythema was manually demarcated with black marker; continue to monitor for signs of increased erythema  · Temperature on admission 98° F; last WBC measured 6 26 at 11:09 a m  on 09/11/2019; continue to monitor vital signs and CBC  · Continue Ancef as ordered  3) Elevated blood pressure reading  · Blood pressure of 147/115 on admission  · Last blood pressure measured 150/100 at 8:00 p m  · Will consider p r n  blood pressure medication if next diastolic blood pressure reading above 100  · Continue to monitor vital signs  4) Elevated blood glucose  · Last blood glucose measured 248 at 11:09 a m  on 09/11/2019  · Hemoglobin A1c and lipid panel ordered; results pending  · Continue to monitor with BMPs    VTE Pharmacologic Prophylaxis: Heparin  VTE Mechanical Prophylaxis: sequential compression device    CHIEF COMPLAINT     No chief complaint on file  HISTORY OF PRESENT ILLNESS     This is a 15-year-old male with past medical history significant for hypertension and previous tobacco abuse who presented on 09/11/2019 to [facility] secondary to increased leg swelling  Patient reportedly had a recent flight of prolonged duration (approximately 10 days ago) and subsequently developed right lower extremity swelling  On presentation to emergency department patient was noted to have substantial swelling in his right lower extremity, as well as mild erythematous area around a wound on his right lower extremity that reportedly occurred after a  accident recently (2-3 weeks ago)  Patient's vital signs on presentation were pulse rate of 104, respirations 19, blood pressure 209/95, O2 sat 96% and temperature 98 2°  Patient had a ultrasound of his lower extremities that was notable for extensive acute occlusive deep vein thrombus throughout the entire venous system in the common femoral vein deep femoral vein popliteal vein peroneal and posterior tibial vein    Patient was started on a heparin drip as well as Keflex for his wound on his lower extremity  Upon arrival in the emergency department at [facility], vital signs were as follows:  Temperature 98° F, heart rate 87, respiratory rate 18, blood pressure 147/115, 96% oxygen saturation on room air  Initial laboratory evaluation revealed a sodium of 135, a random glucose of 248, a white blood cell count of 6 26, hemoglobin of 14 1, and APTT of 28  Initial 12 lead ECG revealed normal sinus rhythm with left axis deviation and incomplete right bundle branch block (no previous ECGs available)  Patient denies headaches, fevers, chills, weight loss, vision changes, lightheadedness, dizziness, chest pain, shortness of breath, cough, palpitations, abdominal pain, diarrhea, constipation, dysuria, lower extremity pain, and lower extremity paresthesia or anesthesia  Patient does not currently take any medications; family history of hyperlipidemia in mother and father; no known medication allergies; endorses 8 pack-year smoking history, however quit in ; denies alcohol and recreational drug use  Right lower extremity erythema was manually demarcated with black marker  Of note, last tetanus booster was administered in approximately   REVIEW OF SYSTEMS     Negative except as noted above  OBJECTIVE     Vitals:    19   BP: (!) 147/115 150/100   BP Location:  Right arm   Pulse: 87    Resp: 18    Temp: 98 °F (36 7 °C)    SpO2: 96%    Weight:  103 kg (226 lb)   Height:  5' 9" (1 753 m)      Temperature:   Temp (24hrs), Av 9 °F (36 6 °C), Min:97 5 °F (36 4 °C), Max:98 2 °F (36 8 °C)    Temperature: 98 °F (36 7 °C)  Intake & Output:  I/O     None        Weights:   IBW: 70 7 kg    Body mass index is 33 37 kg/m²  Weight (last 2 days)     Date/Time   Weight    19   103 (226)            Physical Exam   Constitutional: He is oriented to person, place, and time  He appears well-developed and well-nourished   No distress  HENT:   Head: Normocephalic and atraumatic  Eyes: Pupils are equal, round, and reactive to light  Conjunctivae and EOM are normal    Neck: Neck supple  Cardiovascular: Normal rate, regular rhythm, normal heart sounds and intact distal pulses  Exam reveals no gallop and no friction rub  No murmur heard  Pulmonary/Chest: Effort normal and breath sounds normal  No respiratory distress  He has no wheezes  He exhibits no tenderness  Abdominal: Soft  Bowel sounds are normal  He exhibits no distension and no mass  There is no tenderness  Obese   Musculoskeletal: He exhibits no tenderness or deformity  Right calf nontender to palpation; tenderness to palpation over areas of erythema; right calf measures 17 inches in circumference, left calf measures 15 inches in circumference   Lymphadenopathy:     He has no cervical adenopathy  Neurological: He is alert and oriented to person, place, and time  No cranial nerve deficit or sensory deficit  Coordination normal    Skin: Skin is warm and dry  Capillary refill takes 2 to 3 seconds  He is not diaphoretic  There is erythema  Diffuse right lower extremity erythema with areas of focal macular erythema; 2 circular right lower extremity wounds with surrounding erythema; right lower extremity warm to touch over areas of erythema   Psychiatric: He has a normal mood and affect  His behavior is normal      PAST MEDICAL HISTORY     Past Medical History:   Diagnosis Date    Diabetes mellitus ([facility] [address] )     remote, diet controlled    Hypertension      PAST SURGICAL HISTORY   History reviewed  No pertinent surgical history  SOCIAL & FAMILY HISTORY     Social History     Substance and Sexual Activity   Alcohol Use Not Currently     Substance and Sexual Activity   Alcohol Use Not Currently        Substance and Sexual Activity   Drug Use Not Currently     Social History     Tobacco Use   Smoking Status Former Smoker   Smokeless Tobacco Never Used     History reviewed  No pertinent family history  LABORATORY DATA     Labs: I have personally reviewed pertinent reports  Results from last 7 days   Lab Units 09/11/19  1109   WBC Thousand/uL 6 26   HEMOGLOBIN g/dL 14 1   HEMATOCRIT % 42 5   PLATELETS Thousands/uL 324   NEUTROS PCT % 61   MONOS PCT % 9      Results from last 7 days   Lab Units 09/11/19  1109   POTASSIUM mmol/L 4 1   CHLORIDE mmol/L 101   CO2 mmol/L 26   BUN mg/dL 18   CREATININE mg/dL 0 82   CALCIUM mg/dL 9 1              Results from last 7 days   Lab Units 09/11/19  1143   INR  1 02   PTT seconds 28             Micro:  No results found for: BLOODCX, URINECX, WOUNDCULT, SPUTUMCULTUR  IMAGING & DIAGNOSTIC TESTS     Imaging: I have personally reviewed pertinent reports  No results found  EKG, Pathology, and Other Studies: I have personally reviewed pertinent reports  ALLERGIES   No Known Allergies  MEDICATIONS PRIOR TO ARRIVAL     Prior to Admission medications    Not on File     MEDICATIONS ADMINISTERED IN LAST 24 HOURS     Medication Administration - last 24 hours from 09/10/2019 2054 to 09/11/2019 2054       Date/Time Order Dose Route Action Action by     09/11/2019 2051 heparin (porcine) injection 8,000 Units 8,000 Units Intravenous Not Given Leoncio Tompkins        CURRENT MEDICATIONS       Current Facility-Administered Medications:  cefazolin 2,000 mg Intravenous Q8H Inder Juarez MD   heparin  Intravenous Once Jose Enrique Watson MD             Admission Time  I spent 30 minutes admitting the patient  This involved direct patient contact where I performed a full history and physical, reviewing previous records, and reviewing laboratory and other diagnostic studies  Portions of the record may have been created with voice recognition software  Occasional wrong word or "sound a like" substitutions may have occurred due to the inherent limitations of voice recognition software    Read the chart carefully and recognize, using context, where substitutions have occurred     ==  Angelique Araujo MD  [address]  Internal Medicine Residency PGY-1

## 2019-09-12 NOTE — CONSULTS
Consultation - Vascular Surgery   Ольга Hurst 63 y o  male MRN: [id number]  Unit/Bed#: [facility] 820-01 Encounter: [id number]    Assessment/Plan     Assessment:  71-year-old male with uncontrolled diabetes (hemoglobin A1c: 11 4) who presents with extensive right lower extremity DVT  Venous duplex of right lower extremity extensive acute occlusive DVT throughout common femoral vein, femoral vein, deep femoral vein, popliteal vein, peroneal and PT vein with triphasic popliteal PT and AT arterial waveform  Patient is currently on heparin drip  Vascular surgery consulted for management of acute right lower extremity DVT  Patient's vitals stable other than hypertension 150/100  Plan:  Continue heparin drip  Compression dressing on right lower extremity  Elevate right lower extremity  Recommend discussion with case management for Xarelto or Eliquis coverage through pts insurance  Rest of care per primary team for hypertension and diabetes management     History of Present Illness     HPI:  Ольга Hurst is a 63 y o  male with past medical history of hypertension and diet-controlled diabetes who presented to Urgent Care with right lower extremity swelling and was subsequently sent to the [facility] ED for further evaluation  Venous duplex ultrasound showed extensive right lower extremity DVT  Patient was subsequently transferred to [facility] for further treatment and evaluation  Pt complained of muscular pain in right calf on Monday that has since resolved  Patient noticed swelling in right foot that started on Monday and has gradually progressed today he noticed swelling and 'pressue' in calf  Denies numbness or tingling in his extremities  History of 7 hour flight from [place] 10 days and remote history of rock striving his shin  Patient denies chest pain or shortness of breath and any history of previous DVT  Patient claims father had VTE that was provoked by Trauma   Pt has remote history of trauma to right shin from rock hitting it while he was mowing his lawn 3 weeks ago  Inpatient consult to Vascular Surgery     Performed by  Jesús Marshall MD     Authorized by Izetta Claude, MD              Review of Systems   Constitutional: Negative for fever  Respiratory: Negative for shortness of breath  Cardiovascular: Positive for leg swelling (RLE)  Negative for chest pain  Gastrointestinal: Negative for abdominal pain  Denies issues with BM     Genitourinary: Negative for difficulty urinating  Skin: Positive for color change and rash (on RLE)  Historical Information   Past Medical History:   Diagnosis Date    Diabetes mellitus ([facility] [address] )     remote, diet controlled    Hypertension      History reviewed  No pertinent surgical history  Social History   Social History     Substance and Sexual Activity   Alcohol Use Not Currently     Social History     Substance and Sexual Activity   Drug Use Not Currently     Social History     Tobacco Use   Smoking Status Former Smoker   Smokeless Tobacco Never Used     Family History: History reviewed  No pertinent family history      Meds/Allergies   current meds:   Current Facility-Administered Medications   Medication Dose Route Frequency    ceFAZolin (ANCEF) IVPB (premix) 2,000 mg  2,000 mg Intravenous Q8H    heparin (porcine) 25,000 units in 250 mL infusion (premix)  3-30 Units/kg/hr (Order-Specific) Intravenous Titrated    heparin (porcine) injection 4,000 Units  4,000 Units Intravenous PRN     No Known Allergies    Objective   First Vitals:   Blood Pressure: (!) 147/115 (09/11/19 1947)  Pulse: 87 (09/11/19 1947)  Temperature: 98 °F (36 7 °C) (09/11/19 1947)  Respirations: 18 (09/11/19 1947)  Height: 5' 9" (175 3 cm) (09/11/19 1954)  Weight - Scale: 103 kg (226 lb) (09/11/19 1954)  SpO2: 96 % (09/11/19 1947)    Current Vitals:   Blood Pressure: 150/100(RN notified) (09/11/19 1954)  Pulse: 87 (09/11/19 1947)  Temperature: 98 °F (36 7 °C) (09/11/19 1947)  Respirations: 18 (09/11/19 1947)  Height: 5' 9" (175 3 cm) (09/11/19 1954)  Weight - Scale: 103 kg (226 lb) (09/11/19 1954)  SpO2: 96 % (09/11/19 1947)    No intake or output data in the 24 hours ending 09/11/19 2058    Invasive Devices     Peripheral Intravenous Line            Peripheral IV 09/11/19 Left Forearm less than 1 day                Physical Exam   Constitutional: He is oriented to person, place, and time  He appears well-developed and well-nourished  No distress  HENT:   Head: Normocephalic and atraumatic  Eyes: EOM are normal    Neck: Normal range of motion  Cardiovascular: Normal rate and normal heart sounds  Pulmonary/Chest: Breath sounds normal  No stridor  He is in respiratory distress  He has no wheezes  Abdominal: Soft  He exhibits no distension and no mass  There is no tenderness  There is guarding  There is no rebound  Musculoskeletal: Normal range of motion  He exhibits edema (RLE larger than left)  Neurological: He is alert and oriented to person, place, and time  Skin: Skin is warm and dry  Capillary refill takes less than 2 seconds  Rash ( raised nonerythematous small papules on knee ) noted  There is erythema (mild erythema of RLE, small area of scar with surrounding erythema, non blanching  Area of red non raised circular rash on back on right calf  )  Psychiatric: He has a normal mood and affect   His behavior is normal  Judgment and thought content normal        Lab Results:   CBC:   Lab Results   Component Value Date    WBC 6 26 09/11/2019    HGB 14 1 09/11/2019    HCT 42 5 09/11/2019    MCV 86 09/11/2019     09/11/2019    MCH 28 5 09/11/2019    MCHC 33 2 09/11/2019    RDW 12 2 09/11/2019    MPV 10 2 09/11/2019    NRBC 0 09/11/2019   , CMP:   Lab Results   Component Value Date    SODIUM 135 (L) 09/11/2019    K 4 1 09/11/2019     09/11/2019    CO2 26 09/11/2019    BUN 18 09/11/2019    CREATININE 0 82 09/11/2019    CALCIUM 9 1 09/11/2019    EGFR 97 09/11/2019   , Coagulation:   Lab Results   Component Value Date    INR 1 02 09/11/2019     Imaging: I have personally reviewed pertinent reports  Venous Duplex of lower extremity:   RIGHT LOWER LIMB:  There is evidence of extensive acute occlusive deep vein thrombosis throughout  the entire venous system in the Common Femoral Vein, Femoral Vein, Deep Femoral  Vein, Popliteal Vein, Peroneal, and Posterior Tibial Vein  There is NO evidence  of deep vein thrombosis in the External Iliac Vein  No evidence of superficial thrombophlebitis noted  Doppler evaluation shows a normal response to augmentation maneuvers  Popliteal, posterior tibial and anterior tibial arterial Doppler waveforms are  triphasic  LEFT LOWER LIMB:  No evidence of acute or chronic deep vein thrombosis  No evidence of superficial thrombophlebitis noted  Doppler evaluation shows a normal response to augmentation maneuvers  Popliteal, posterior tibial and anterior tibial arterial Doppler waveforms are  triphasic  EKG, Pathology, and Other Studies: I have personally reviewed pertinent reports

## 2019-09-12 NOTE — UTILIZATION REVIEW
Initial Clinical Review    Admission: Date/Time/Statement: Inpatient Admission Orders (From admission, onward)     Ordered        09/11/19 2036  Inpatient Admission  Once              TRANSFER FROM [facility] TO [facility]  LEVEL OF CARE - EXTENSIVE ACUTE OCCLUSIVE DVT THROUGHOUT ENTIRE VENOUS SYSTEM ON R LOWER SIDE OF BODY  VASCULAR SURGERY CONSULT      Orders Placed This Encounter   Procedures    Inpatient Admission     Standing Status:   Standing     Number of Occurrences:   1     Order Specific Question:   Admitting Physician     Answer:   RADHA BENAVIDES [[id number]]     Order Specific Question:   Level of Care     Answer:   Level 2 Stepdown / HOT [14]     Order Specific Question:   Estimated length of stay     Answer:   More than 2 Midnights     Order Specific Question:   Certification     Answer:   I certify that inpatient services are medically necessary for this patient for a duration of greater than two midnights  See H&P and MD Progress Notes for additional information about the patient's course of treatment  Assessment/Plan:   MR RODRIGUEZ IS A 58 YO MALE TRANSFERRED FROM [facility] TO [facility] FOR VASCULAR SURGERY CONSULT  HE IS ADMITTED TO INPATIENT STATUS FOR TREATMENT OF EXTENSIVE ACUTE OCCLUSIVE DVT THROUGHOUT THE ENTIRE VENOUS SYSTEM IN CFV, DFV, PV, PERONEAL VEIN, PTV  HE HAD A RECENT OVERSEAS TRIP  HE HAS RLE EDEMA, ERYTHEMA , HEADACHE, FEVER, CHILLS, WEIGHT LOSS, LIGHTHEADEDNESS, DIZZINESS, CHEST PAIN AND SOB  HE IS ON A HEPARIN DRIP AND WILL BE NPO UNTIL SEEN BY VASCULAR TEAM, AMBULATE 3 X DAILY , MONITOR FOR SIGNS OF PE   RLE IS 2 INCHES LARGER IN CIRCUMFERENCE THEN LLE  CELLULITIS RLE - IV ANTIBIOTICS  ELEVATED BP READING - PRN ANTIHYPERTENSIVES  ELEVATED BLOOD GLUCOSE - POC GLUCOSE AND BMPs  9/12 CONTINUE HEPARIN DRIP UNTIL INR THERAPEUTIC, EITHER XARELTO OR ELIQUIS  CONTINUE ANCEF IV, START LANTUS AT HS, METFORMIN         Wt Readings from Last 1 Encounters:   09/11/19 103 kg (226 lb) Additional Vital Signs:   09/12/19 14:16:10  --  89  --  160/90  113  96 %  --   09/12/19 07:29:20  98 6 °F (37 °C)  75  16  145/90  108  96 %  --   09/11/19 23:19:41  98 9 °F (37 2 °C)  77  18  139/86  104  95 %  --   09/11/19 1954  --  --  --  150/100   --  --  --   09/11/19 19:47:44  98 °F (36 7 °C)  87  18  147/115Abnormal   126  96 %  --     Pertinent Labs/Diagnostic Test Results:     9/11 BLE VENOUS DUPLEX -   RIGHT LOWER LIMB:  There is evidence of extensive acute occlusive deep vein thrombosis throughout  the entire venous system in the Common Femoral Vein, Femoral Vein, Deep Femoral  Vein, Popliteal Vein, Peroneal, and Posterior Tibial Vein  There is NO evidence  of deep vein thrombosis in the External Iliac Vein  No evidence of superficial thrombophlebitis noted  Doppler evaluation shows a normal response to augmentation maneuvers  Popliteal, posterior tibial and anterior tibial arterial Doppler waveforms are  triphasic  LEFT LOWER LIMB:  No evidence of acute or chronic deep vein thrombosis  No evidence of superficial thrombophlebitis noted  Doppler evaluation shows a normal response to augmentation maneuvers  Popliteal, posterior tibial and anterior tibial arterial Doppler waveforms are  Triphasic      9/11 ECG -  Normal sinus rhythm with sinus arrhythmia  Left axis deviation  Incomplete right bundle branch block  Abnormal ECG  No previous ECGs available    9/11 ECG - Normal sinus rhythm  Left axis deviation  Abnormal ECG  When compared with ECG of 11-SEP-2019 11:31,  Incomplete right bundle branch block is no longer Present    Results from last 7 days   Lab Units 09/12/19  0331 09/11/19  1109   WBC Thousand/uL 7 98 6 26   HEMOGLOBIN g/dL 13 5 14 1   HEMATOCRIT % 40 7 42 5   PLATELETS Thousands/uL 311 324   NEUTROS ABS Thousands/µL  --  3 80     Results from last 7 days   Lab Units 09/12/19  0317 09/11/19  1109   SODIUM mmol/L 140 135*   POTASSIUM mmol/L 3 9 4 1   CHLORIDE mmol/L 110* 101 CO2 mmol/L 23 26   ANION GAP mmol/L 7 8   BUN mg/dL 10 18   CREATININE mg/dL 0 76 0 82   EGFR ml/min/1 73sq m 101 97   CALCIUM mg/dL 9 0 9 1     Results from last 7 days   Lab Units 09/12/19  0317 09/11/19  1109   GLUCOSE RANDOM mg/dL 164* 248*     Results from last 7 days   Lab Units 09/11/19  2104   HEMOGLOBIN A1C % 11 4*   EAG mg/dl 280     Results from last 7 days   Lab Units 09/12/19  1032 09/12/19  0251 09/11/19  2104 09/11/19  1143   PROTIME seconds  --   --   --  13 4   INR   --   --   --  1 02   PTT seconds 66* 54* 27 28     Results from last 7 days   Lab Units 09/11/19  2104   TSH 3RD GENERATON uIU/mL 0 958     Past Medical History:   Diagnosis Date    Diabetes mellitus ([place] [address] )     remote, diet controlled    Hypertension      Admitting Diagnosis: DVT (deep vein thrombosis) in pregnancy ([facility] ) [O22 30, I82 409]     Age/Sex: 63 y o  male     Admission Orders:    Current Facility-Administered Medications:  cephalexin 500 mg Oral Q6H [facility] & half-way     heparin (porcine) 3-30 Units/kg/hr (Order-Specific) Intravenous Titrated Last Rate: 20 Units/kg/hr (09/12/19 0804)    heparin (porcine) 4,000 Units Intravenous PRN  X1 9/12   insulin glargine 20 Units Subcutaneous HS     insulin lispro 1-5 Units Subcutaneous HS     insulin lispro 1-6 Units Subcutaneous TID AC       LEVEL 2 STEPDOWN AND CHANGED THEN TO MED SURG  SCDs  OOB AS RANDI   HEPARIN DRIP   THIGH HIGH COMPRESION SOCKS   AMBULATE 3 X DAILY   POC GLUCOSE AC/HS WITH SSI COVERAGE   ADA DIET   IP CONSULT TO VASCULAR SURGERY  IP CONSULT TO CASE MANAGEMENT    Network Utilization Review Department  Phone: [phone]; Fax [phone]  [email or web address]  ATTENTION: Please call with any questions or concerns to [phone]  and carefully listen to the prompts so that you are directed to the right person  Send all requests for admission clinical reviews, approved or denied determinations and any other requests to fax [phone]   All voicemails are confidential

## 2019-09-12 NOTE — SOCIAL WORK
Cm met with patient and described CM role  Patient lives with his wife Eulalia Hensley in a two story house  [address]  Full flight to second floor  Bed and bath on second floor  Bath on first floor  [employer]  Patient drives  He is employed  Patient uses [facility] in [place]  NO living will declined information on one  CM reviewed d/c planning process including the following: identifying help at home, patient preference for d/c planning needs, Discharge Lounge, [facility] Meds to Bed program, availability of treatment team to discuss questions or concerns patient and/or family may have regarding understanding medications and recognizing signs and symptoms once discharged  CM also encouraged patient to follow up with all recommended appointments after discharge  Patient advised of importance for patient and family to participate in managing patients medical well being

## 2019-09-12 NOTE — NURSING NOTE
Multiple attempts made to secure telemetry box for patient  No telemetry boxes have been available throughout the night  Hospital supervisor Trent Hadeed aware of need for box  Patient connected to [employer] HSPTL monitor and strip printed; will document appropriately

## 2019-09-12 NOTE — SOCIAL WORK
Faxed Xarelto, Lantus and Glucometer script + supplies to [facility] in [place], Per patients request for a price check

## 2019-09-13 VITALS
TEMPERATURE: 98.2 F | DIASTOLIC BLOOD PRESSURE: 89 MMHG | BODY MASS INDEX: 33.47 KG/M2 | RESPIRATION RATE: 16 BRPM | HEIGHT: 69 IN | OXYGEN SATURATION: 97 % | SYSTOLIC BLOOD PRESSURE: 156 MMHG | WEIGHT: 226 LBS | HEART RATE: 82 BPM

## 2019-09-13 PROBLEM — R73.09 ELEVATED GLUCOSE: Status: RESOLVED | Noted: 2019-09-11 | Resolved: 2019-09-13

## 2019-09-13 PROBLEM — L03.115 CELLULITIS OF RIGHT LOWER EXTREMITY: Status: RESOLVED | Noted: 2019-09-11 | Resolved: 2019-09-13

## 2019-09-13 LAB
APTT PPP: 72 SECONDS (ref 23–37)
APTT PPP: 91 SECONDS (ref 23–37)
GLUCOSE SERPL-MCNC: 165 MG/DL (ref 65–140)
GLUCOSE SERPL-MCNC: 182 MG/DL (ref 65–140)

## 2019-09-13 PROCEDURE — NC001 PR NO CHARGE: Performed by: INTERNAL MEDICINE

## 2019-09-13 PROCEDURE — 85730 THROMBOPLASTIN TIME PARTIAL: CPT | Performed by: SURGERY

## 2019-09-13 PROCEDURE — 99238 HOSP IP/OBS DSCHRG MGMT 30/<: CPT | Performed by: INTERNAL MEDICINE

## 2019-09-13 PROCEDURE — 85730 THROMBOPLASTIN TIME PARTIAL: CPT | Performed by: INTERNAL MEDICINE

## 2019-09-13 PROCEDURE — 82948 REAGENT STRIP/BLOOD GLUCOSE: CPT

## 2019-09-13 RX ORDER — LISINOPRIL 10 MG/1
10 TABLET ORAL DAILY
Qty: 30 TABLET | Refills: 0 | Status: SHIPPED | OUTPATIENT
Start: 2019-09-13 | End: 2019-10-04 | Stop reason: SDUPTHER

## 2019-09-13 RX ORDER — METFORMIN HYDROCHLORIDE EXTENDED-RELEASE TABLETS 500 MG/1
500 TABLET, FILM COATED, EXTENDED RELEASE ORAL
Qty: 30 TABLET | Refills: 0 | Status: SHIPPED | OUTPATIENT
Start: 2019-09-13 | End: 2019-09-13 | Stop reason: HOSPADM

## 2019-09-13 RX ORDER — ATORVASTATIN CALCIUM 20 MG/1
20 TABLET, FILM COATED ORAL DAILY
Qty: 30 TABLET | Refills: 0 | Status: SHIPPED | OUTPATIENT
Start: 2019-09-13 | End: 2019-10-04 | Stop reason: SDUPTHER

## 2019-09-13 RX ORDER — CEPHALEXIN 500 MG/1
500 CAPSULE ORAL EVERY 6 HOURS SCHEDULED
Qty: 20 CAPSULE | Refills: 0 | Status: SHIPPED | OUTPATIENT
Start: 2019-09-13 | End: 2019-09-18

## 2019-09-13 RX ORDER — LISINOPRIL 10 MG/1
10 TABLET ORAL DAILY
Qty: 30 TABLET | Refills: 3 | Status: SHIPPED | OUTPATIENT
Start: 2019-09-13 | End: 2019-09-13 | Stop reason: SDUPTHER

## 2019-09-13 RX ORDER — METFORMIN HYDROCHLORIDE EXTENDED-RELEASE TABLETS 500 MG/1
500 TABLET, FILM COATED, EXTENDED RELEASE ORAL
Qty: 30 TABLET | Refills: 3 | Status: SHIPPED | OUTPATIENT
Start: 2019-09-13 | End: 2019-09-13 | Stop reason: SDUPTHER

## 2019-09-13 RX ORDER — CEPHALEXIN 500 MG/1
500 CAPSULE ORAL EVERY 6 HOURS SCHEDULED
Qty: 20 CAPSULE | Refills: 0 | Status: SHIPPED | OUTPATIENT
Start: 2019-09-13 | End: 2019-09-13

## 2019-09-13 RX ORDER — METFORMIN HYDROCHLORIDE EXTENDED-RELEASE TABLETS 500 MG/1
500 TABLET, FILM COATED, EXTENDED RELEASE ORAL 2 TIMES DAILY WITH MEALS
Qty: 60 TABLET | Refills: 3 | OUTPATIENT
Start: 2019-09-13

## 2019-09-13 RX ORDER — ATORVASTATIN CALCIUM 40 MG/1
40 TABLET, FILM COATED ORAL DAILY
Qty: 30 TABLET | Refills: 3 | OUTPATIENT
Start: 2019-09-13

## 2019-09-13 RX ORDER — CEPHALEXIN 500 MG/1
500 CAPSULE ORAL EVERY 6 HOURS SCHEDULED
Qty: 20 CAPSULE | Refills: 0 | OUTPATIENT
Start: 2019-09-13 | End: 2019-09-18

## 2019-09-13 RX ORDER — ATORVASTATIN CALCIUM 20 MG/1
20 TABLET, FILM COATED ORAL DAILY
Qty: 30 TABLET | Refills: 3 | Status: SHIPPED | OUTPATIENT
Start: 2019-09-13 | End: 2019-09-13 | Stop reason: SDUPTHER

## 2019-09-13 RX ORDER — METFORMIN HYDROCHLORIDE 500 MG/1
500 TABLET, FILM COATED, EXTENDED RELEASE ORAL
Qty: 30 TABLET | Refills: 3 | Status: SHIPPED | OUTPATIENT
Start: 2019-09-13 | End: 2019-09-13 | Stop reason: HOSPADM

## 2019-09-13 RX ADMIN — INSULIN LISPRO 1 UNITS: 100 INJECTION, SOLUTION INTRAVENOUS; SUBCUTANEOUS at 12:32

## 2019-09-13 RX ADMIN — CEPHALEXIN 500 MG: 500 CAPSULE ORAL at 12:30

## 2019-09-13 RX ADMIN — CEPHALEXIN 500 MG: 500 CAPSULE ORAL at 00:06

## 2019-09-13 RX ADMIN — INSULIN LISPRO 1 UNITS: 100 INJECTION, SOLUTION INTRAVENOUS; SUBCUTANEOUS at 07:43

## 2019-09-13 RX ADMIN — CEPHALEXIN 500 MG: 500 CAPSULE ORAL at 06:25

## 2019-09-13 RX ADMIN — RIVAROXABAN 15 MG: 15 TABLET, FILM COATED ORAL at 11:08

## 2019-09-13 NOTE — PROGRESS NOTES
IM Medical Student Progress Note   Unit/Bed#: [facility] 820-01 Encounter: [id number]  SOD Team A      Tamar Marte 63 y o  male [id number]    Hospital Stay Days: 2      Assessment/Plan: Konrad Ireland is a 62 yo M with past medical history of type 2 diabetes and 8 pack years smoking history quit in  now presenting with RLE swelling due to extensive occlusive DVT in common femoral, femoral, deep femoral, popliteal, peroneal, and posterior tibial veins  Patient is stable for discharge today    Principal Problem:    DVT (deep venous thrombosis) ([facility])  Active Problems:    Cellulitis of right leg    Elevated blood pressure reading    Hyperlipidemia    Diabetes mellitus ([facility] [address] )    #DVT  - RLE is soft and not swollen today  - Continue hep gtt until Xarelto is given  - Start Xarelto 15 mg bid for 21 days, then 20 mg qd for a total of 3 to 6 months  - Compression stocking in place over RLE    #Elevated glucose/Diabetes mellitus  - Hba1c of 11 4  Latest blood glucose today 159  - Patient counseled on need for insulin due to uncontrolled hyperglycemia  - Start Basiclar insulin regimen and metformin upon discharge and follow up with outpatient primary care    #Cellulitis of right lower extremity  - Mild, patchy RLE erythema that is improved since yesterday and does not extend past marked lines  - On day 2 out of 7 of Keflex 500 mg    #Elevated blood pressure reading  - Latest BP reading 156/89  - Discussed starting lisinopril with patient    #Hyperlipidemia  - Discussed starting atorvastatin, and patient is willing to start after a week    Subjective:   Patient is sitting up in bed in no acute distress  He has not been out of bed and walking around  He denies any pain, fever, chills, sweats, nausea, or vomiting  He is tolerating a regular diet well  He has no other complaints and feels ready to go home         Vitals: Temp (24hrs), Av 9 °F (36 6 °C), Min:97 3 °F (36 3 °C), Max:98 2 °F (36 8 °C)  Current: Temperature: 98 2 °F (36 8 °C)  Vitals:    09/12/19 1451 09/12/19 2357 09/13/19 0727 09/13/19 0745   BP: 158/90 119/73 148/88    Pulse: 87 78 71    Resp: 18 18 16    Temp: (!) 97 3 °F (36 3 °C) 98 2 °F (36 8 °C) 98 2 °F (36 8 °C)    SpO2: 96% 95% 95% 96%   Weight:       Height:        Body mass index is 33 37 kg/m²  I/O last 24 hours: In: 1195 7 [P O :540; I V :655 7]  Out: 0     Review of Systems   Constitution: Negative for chills, decreased appetite, diaphoresis, fever, weight gain and weight loss  Cardiovascular: Negative for chest pain  Respiratory: Negative for shortness of breath  Skin: Positive for poor wound healing  Gastrointestinal: Negative for abdominal pain, change in bowel habit, hematochezia, melena, nausea and vomiting  Genitourinary: Negative for bladder incontinence, dysuria, hematuria and hesitancy  Physical Exam: Physical Exam   Constitutional: He is oriented to person, place, and time  He appears well-developed and well-nourished  No distress  HENT:   Head: Normocephalic and atraumatic  Cardiovascular: Normal rate, regular rhythm, normal heart sounds and intact distal pulses  PT 2+ bilaterally   Pulmonary/Chest: Effort normal and breath sounds normal    Abdominal: Soft  Bowel sounds are normal  He exhibits no distension  There is no tenderness  Musculoskeletal: He exhibits no edema or tenderness  Neurological: He is alert and oriented to person, place, and time  Strength and sensation intact in bilateral feet   Skin: Skin is warm  He is not diaphoretic  There is erythema  Very mild erythema of RLE that does not extend past area marked  Wound with scab present on anterior RLE with slight surrounding erythema and no purulence   Compression stocking in place       Invasive Devices     Peripheral Intravenous Line            Peripheral IV 09/11/19 Left Forearm 1 day                          Labs:   Recent Results (from the past 24 hour(s))   APTT    Collection Time: 09/12/19 10:32 AM   Result Value Ref Range    PTT 66 (H) 23 - 37 seconds   Fingerstick Glucose (POCT)    Collection Time: 09/12/19  3:52 PM   Result Value Ref Range    POC Glucose 218 (H) 65 - 140 mg/dl   APTT    Collection Time: 09/12/19  4:34 PM   Result Value Ref Range    PTT 59 (H) 23 - 37 seconds   Fingerstick Glucose (POCT)    Collection Time: 09/12/19  8:47 PM   Result Value Ref Range    POC Glucose 159 (H) 65 - 140 mg/dl   APTT    Collection Time: 09/13/19 12:03 AM   Result Value Ref Range    PTT 91 (H) 23 - 37 seconds   APTT    Collection Time: 09/13/19  6:03 AM   Result Value Ref Range    PTT 72 (H) 23 - 37 seconds       Radiology Results: I have personally reviewed pertinent reports  Other Diagnostic Testing:   I have personally reviewed pertinent reports  Active Meds:   Current Facility-Administered Medications   Medication Dose Route Frequency    cephalexin (KEFLEX) capsule 500 mg  500 mg Oral Q6H [address]    heparin (porcine) 25,000 units in 250 mL infusion (premix)  3-30 Units/kg/hr (Order-Specific) Intravenous Titrated    heparin (porcine) injection 4,000 Units  4,000 Units Intravenous PRN    insulin glargine (LANTUS) subcutaneous injection 20 Units 0 2 mL  20 Units Subcutaneous HS    insulin lispro (HumaLOG) 100 units/mL subcutaneous injection 1-5 Units  1-5 Units Subcutaneous HS    insulin lispro (HumaLOG) 100 units/mL subcutaneous injection 1-6 Units  1-6 Units Subcutaneous TID AC         VTE Pharmacologic Prophylaxis: Heparin  VTE Mechanical Prophylaxis: sequential compression device on left leg   Compression stocking on right leg    [place]

## 2019-09-13 NOTE — DISCHARGE SUMMARY
[address] Discharge Summary - Medical Susie Diane 63 y o  male MRN: [id number]    [address] [facility] [address] [facility] 8 Room / Bed: [facility] 820/[facility] 820-01 Encounter: [id number]    BRIEF OVERVIEW    Admitting Provider: Lam Foster MD  Discharge Provider: Lam Foster MD    Discharge To: Home    Admission Date: 9/11/2019     Discharge Date:  09/13/2019    Primary Discharge Diagnosis  Principal Problem:    DVT (deep venous thrombosis) ([facility])  Active Problems:    Cellulitis of right leg    Elevated blood pressure reading    Hyperlipidemia    Diabetes mellitus ([facility] )  Resolved Problems:    DVT (deep vein thrombosis) in pregnancy ([facility] )    Elevated glucose        Discharge Disposition:  Home  Discharged With Lines: no    Test Results Pending at Discharge:  None    Outpatient Follow-Up  Primary care provider, vascular  Active Issues Requiring Follow-up   DVT, lower extremity infection, diabetes mellitus, elevated blood pressure, hyperlipidemia    Code Status: Level 1 - Full Code  Advance Directive and Living Will: <no information>  Power of :    POLST:      Medications   See after visit summary for reconciled discharge medications provided to patient and family  Allergies  No Known Allergies  Discharge Diet: diabetic diet  Activity restrictions: Activity as tolerated    DETAILS OF HOSPITAL STAY    Subjective:  Patient seen examined at bedside this morning  Patient had no overnight events or complaints  Denies any fever/chills, nausea/vomiting, chest pain, shortness of breath, belly pain, extremity pain or swelling  Will most likely going home this afternoon on anticoagulation, continue antibiotics, starting statin therapy, starting insulin and metformin outpatient therapy      Objective:      Vitals: temperature 98 2° F, pulse 71, respirations 16, blood pressure 148/88, O2 96%    Physical Exam:  GENERAL: NAD  HEENT:  NC/AT, PERRL, EOMI, no scleral icterus  CARDIAC:  RRR, +S1/S2, no S3/S4 heard, no m/g/r  PULMONARY:  CTA B/L, no wheezing/rales/rhonci, non-labored breathing  ABDOMEN:  Soft, NT/ND,no rebound/guarding/rigidity  Extremities:  No edema, no cyanosis, or clubbing, +2 pulses bilaterally  NEUROLOGIC: Grossly intact  SKIN:   erythema of RLE improved, black scab present no erythema or purulence      Hospital Course    63year-old presented for admission after finding provoked DVT 10 days after flight back from [place] and cellulitis of right lower extremity after shin trauma moaning lawn previous week earlier  Patient was placed on heparin drip and will be switched to p o  Xarelto for 3-6 months treatment for extensive DVT  Will take Xarelto 15 mg b i d  For 21 days and then Xarelto 20 mg q day for remaining treatment duration  Ultrasound showed right lower limb evidence of extensive acute occlusion deep vein thrombosis is throughout the entire venous system of common for moral vein, form moral vein, deep femoral vein, popliteal vein, peroneal, and posterior tibial vein  Vascular surgery has signed off the case and recommended conservative treatment at this time with anticoagulation therapy  Patient was originally on cefazolin IV for treatment of cellulitis of right lower extremity that shows no erythema at the time of discharge  Patient was switched to Keflex p o  for a course of 7 days  Of note patient was found to have diabetes mellitus with a A1c of 11 2% and will be started on insulin therapy long-acting 20 mg q h s , and metformin 500 q day  In this hospital course we also recommend starting lisinopril 10 mg and atorvastatin 20 mg due to patient's ASCVD risk score  Patient will think about taking these medications  Patient was asymptomatic throughout visit with no shortness of breath, chest pain, extremity pain or swelling  Patient also understands that it is very important for him to follow up outpatient because of patient's chronic comorbidities    Hemodynamically stable at discharge  Presenting Problem/History of Present Illness  Principal Problem:    DVT (deep venous thrombosis) ([facility])  Active Problems:    Cellulitis of right leg    Elevated blood pressure reading    Hyperlipidemia    Diabetes mellitus ([facility] )  Resolved Problems:    DVT (deep vein thrombosis) in pregnancy ([facility] )    Elevated glucose        Other Pertinent Test Results  Continue to check glucose each day and monitor in log    Discharge Condition: stable      Discharge  Statement   I spent 30 minutes minutes discharging the patient  This time was spent on the day of discharge  I had direct contact with the patient on the day of discharge  Additional documentation is required if more than 30 minutes were spent on discharge       DO Bri Garcia 73 Internal Medicine PGY-1

## 2019-09-13 NOTE — DISCHARGE INSTRUCTIONS
Deep Vein Thrombosis Prevention   WHAT YOU NEED TO KNOW:   Deep vein thrombosis (DVT) is a blood clot that forms in a deep vein of the body  The deep veins in the legs, thighs, and hips are the most common sites for DVT  DVT can also occur in your arms  The clot prevents the normal flow of blood in the vein  The blood backs up and causes pain and swelling  The DVT can break into smaller pieces and travel to your lungs and cause a blockage called a pulmonary embolism  A pulmonary embolism can become life-threatening  DISCHARGE INSTRUCTIONS:   Call 911 for any of the following:   · You feel lightheaded, short of breath, and have chest pain  · You cough up blood  Seek care immediately if:   · Your arm or leg feels warm, tender, and painful  It may look swollen and red  Contact your healthcare provider if:   · You have questions or concerns about your condition or care  Risk factors for DVT:  A DVT can happen to anybody, but certain things can increase your risk  You may be at higher risk if you have had DVT in the past  You may also be at risk if you have a family member who has had blood clots  The following conditions also increase your risk:  · Limited activity caused by bed rest, a leg cast, or sitting for long periods    · Injury to a deep vein, or surgery    · A blood disorder that makes your blood clot faster than normal, such as factor V Leiden mutation    · Age older than 60 years    · Use of hormone replacement therapy or some types of birth control medicine    · Pregnancy, and for 6 weeks after childbirth     · Cancer or heart failure     · A catheter placed in a large vein    · Smoking    · Obesity or varicose veins  Prevent DVT:   · Guidelines for everyone:      ¨ Maintain a healthy weight  Ask your healthcare provider how much you should weigh  Ask him to help you create a weight loss plan if you are overweight  ¨ Do not smoke    Nicotine and other chemicals in cigarettes and cigars can damage blood vessels and increase your risk for a DVT  Ask your healthcare provider for information if you currently smoke and need help to quit  E-cigarettes or smokeless tobacco still contain nicotine  Talk to your healthcare provider before you use these products  ¨ Move regularly if you sit for long periods of time  If you travel by car or work at a desk, move and stretch in your seat several times each hour  In an airplane, get up and walk every hour  Exercise your legs while sitting by raising and lowering your heels  Keep your toes on the floor while you do this  You can also raise and lower your toes while keeping your heels on the floor  Also tighten and release your leg muscles while sitting  ¨ Exercise regularly  to help increase your blood flow  Walking is a good low-impact exercise  Talk to your healthcare provider about the best exercise plan for you  · Guidelines for people at high risk for DVT:      ¨ Take blood thinner medicines as directed  Your healthcare provider may recommend blood thinners and other medicines to help prevent blood clots  ¨ Wear pressure stockings as directed  The stockings are tight and put pressure on your legs  This improves blood flow and helps prevent clots  Wear the stockings during the day  Do not wear them when you sleep  ¨ Elevate your legs  above the level of your heart as often as you can  This will help decrease swelling and pain  Prop your legs on pillows or blankets to keep them elevated comfortably  ¨ Get up and move as directed after surgery or an injury, or during an illness  Early and regular movement can help decrease your risk for DVT by helping to increase your blood flow  Ask your healthcare provider what type of activity you need and how often you should do it  ¨ Change body positions often if you are bedridden  Ask for help to change your position every 1 to 2 hours    Follow up with your healthcare provider as directed:  Write down your questions so you remember to ask them during your visits  © 2017 [employer] Information is for End User's use only and may not be sold, redistributed or otherwise used for commercial purposes  All illustrations and images included in CareNotes® are the copyrighted property of A D A M , Inc  or [employer]  The above information is an  only  It is not intended as medical advice for individual conditions or treatments  Talk to your doctor, nurse or pharmacist before following any medical regimen to see if it is safe and effective for you

## 2019-09-19 NOTE — UTILIZATION REVIEW
Notification of Discharge  This is a Notification of Discharge from our facility [address]  Please be advised that this patient has been discharge from our facility  Below you will find the admission and discharge date and time including the patients disposition  PRESENTATION DATE: 9/11/2019  7:39 PM  OBS ADMISSION DATE:   IP ADMISSION DATE: 9/11/19 1939   DISCHARGE DATE: 9/13/2019  5:55 PM  DISPOSITION: Home/Self Care Home/Self Care   Admission Orders listed below:  Admission Orders (From admission, onward)     Ordered        09/11/19 2036  Inpatient Admission  Once                   Please contact the UR Department if additional information is required to close this patient's authorization/case  [facility] Util[facility] Review Department  Phone: [phone]; Fax [phone]  [email or web address]  ATTENTION: Please call with any questions or concerns to [phone]  and carefully listen to the prompts so that you are directed to the right person  Send all requests for admission clinical reviews, approved or denied determinations and any other requests to fax [phone]   All voicemails are confidential

## 2019-09-24 ENCOUNTER — TRANSITIONAL CARE MANAGEMENT (OUTPATIENT)
Dept: INTERNAL MEDICINE CLINIC | Facility: CLINIC | Age: 59
End: 2019-09-24

## 2019-09-26 ENCOUNTER — OFFICE VISIT (OUTPATIENT)
Dept: INTERNAL MEDICINE CLINIC | Facility: CLINIC | Age: 59
End: 2019-09-26

## 2019-09-26 VITALS
WEIGHT: 230.6 LBS | HEART RATE: 100 BPM | SYSTOLIC BLOOD PRESSURE: 190 MMHG | BODY MASS INDEX: 30.56 KG/M2 | HEIGHT: 73 IN | TEMPERATURE: 98.2 F | DIASTOLIC BLOOD PRESSURE: 110 MMHG

## 2019-09-26 DIAGNOSIS — E11.8 TYPE 2 DIABETES MELLITUS WITH COMPLICATION, WITHOUT LONG-TERM CURRENT USE OF INSULIN (HCC): ICD-10-CM

## 2019-09-26 DIAGNOSIS — E78.5 HYPERLIPIDEMIA, UNSPECIFIED HYPERLIPIDEMIA TYPE: ICD-10-CM

## 2019-09-26 DIAGNOSIS — I82.409 DVT (DEEP VENOUS THROMBOSIS) (HCC): Primary | ICD-10-CM

## 2019-09-26 DIAGNOSIS — I10 HYPERTENSION, UNSPECIFIED TYPE: ICD-10-CM

## 2019-09-26 DIAGNOSIS — E11.9 DIABETES MELLITUS (HCC): ICD-10-CM

## 2019-09-26 LAB — SL AMB POCT GLUCOSE BLD: 295

## 2019-09-26 PROCEDURE — 99495 TRANSJ CARE MGMT MOD F2F 14D: CPT | Performed by: HOSPITALIST

## 2019-09-26 PROCEDURE — 82948 REAGENT STRIP/BLOOD GLUCOSE: CPT | Performed by: HOSPITALIST

## 2019-09-26 RX ORDER — AMLODIPINE BESYLATE 10 MG/1
10 TABLET ORAL DAILY
Qty: 90 TABLET | Refills: 3 | Status: SHIPPED | OUTPATIENT
Start: 2019-09-26 | End: 2019-09-26

## 2019-09-26 RX ORDER — CHLORTHALIDONE 25 MG/1
25 TABLET ORAL DAILY
Qty: 90 TABLET | Refills: 3 | Status: SHIPPED | OUTPATIENT
Start: 2019-09-26 | End: 2019-11-15

## 2019-09-26 NOTE — PROGRESS NOTES
[address] [facility] Visit Note  Mikael Rivera 63 y o  male   MRN: [id number]    Assessment and Plan      Deep venin thrombosis:   Most likely etiology provoked due to recent prolonged flight back from [facility]  U/S notable for extensive acute occlusion DVT throughout the entire venous system in the at the common for moral vein/deep for more of an/popliteal vein/peroneal vein/posterior tibial vein  No history of DVT/PE  Vascular surgery recommended conservative treatment     -continue Xarelto regimen:  15 mg b i d  for 21 days then switch to 20 mg q day, will re-evaluate length the treatment in subsequent visits    Cellulitis of right lower extremity:   Lawnmower accident, trauma to right shin, completed Keflex 500 q 6 hours for 7 days, improving/resolved  -continue to monitor for signs of swelling or redness, no further treatment indicated     Diabetes mellitus type 2:   Hemoglobin A1c of 11 4%, new diagnosis and hospital  -continue Lantus 20 q h s  and metformin 500 q day  -track blood glucose in log to evaluate treatment regimen at next visit  -will talk about diabetes education at next visit    Hypertension:   Elevated blood pressure 190/110 and re-done 10 minutes later 170/100, patient contemplating started medication would like to take it slow and only take a few medications at time  -recommend lisinopril 10 mg q day and clorthaidone 25 mg qday    Hyperlipidemia:   Lipid panel total 186, , triglycerides 175, HDL 46 with ASCVD risk 16 5%, patient contemplating taking atorvastatin  -recommend atorvastatin 20 mg q day      Health Maintenance:  -refusing any vaccinations today  -will evaluate for colonoscopy at next visit    Schedule a follow-up appointment in 1 month  Chief Complaint: TCM   Subjective     History of Present Illness:    71-year-old with provoked DVT, diabetes mellitus type 2, hypertension, hyperlipidemia presenting to office for TCM visit  Patient recently hospitalized due to right DVT, D/C 09/13/2019  Ultrasound showed right lower limb evidence of extensive acute occlusion deep vein thrombosis is throughout the entire venous system of common for moral vein, form moral vein, deep femoral vein, popliteal vein, peroneal, and posterior tibial vein  Was switched from heparin drip to Xarelto regimen  Finishing 21 day course of Xarelto 15 mg b i d  and will switch to Xarelto 20 mg q day  Vascular surgery had recommended conservative treatment  Also had cellulitis of right lower extremity after shin trauma mowing lawn, finished 7 day antibiotic course, Keflex, with healing ulcer  Denies any fever/chills, redness or swelling at site  Patient also found newly diagnosed with diabetes mellitus type 2 with a hemoglobin A1c of 11 4% found in hospital   Has been taking long-acting insulin 20 mg q h s  and metformin 500 q day since hospitalization  Patient reports that glucose has been between 120-150 regularly  Today in office glucose 295  Recommended to keep track with log at different times of the day to follow how medication is helping  Blood pressure has been high, recommended in hospital lisinopril 10 mg and atorvastatin 20 mg due to patient's ASCVD score risk score  But at this time patient would like to take only a few medications at a time and then re-evaluate so has not taken lisinopril or atorvastatin  Medications have been sent to pharmacy if patient would like to start  Patient has no complaints, no shortness of breath, chest pain, extremity pain or swelling      TCM Call (since 8/26/2019)     Date and time call was made  9/24/2019 11:09 AM    Hospital care reviewed  Records reviewed    Patient was hospitialized at  [facility]    Date of Admission  09/11/19    Date of discharge  09/13/19    Diagnosis  DVT (deep venous thrombosis)  I82 409    Disposition  Home    Were the patients medications reviewed and updated  Yes Current Symptoms  None      TCM Call (since 8/26/2019)     Post hospital issues  None    Should patient be enrolled in anticoag monitoring? No    Scheduled for follow up? Yes <img src='C:FILES (X86)    Patients specialists  Other (comment)    Other specialists names  [facility] - patient will call for appt  Other specialists contcat #  [phone]    Did you obtain your prescribed medications  Yes    Do you need help managing your prescriptions or medications  No    Is transportation to your appointment needed  No    I have advised the patient to call PCP with any new or worsening symptoms  Nevin Keller RN    Have you fallen in the last 12 months  No    Interperter language line needed  No    Counseling  Patient    Counseling topics  patient and family education; Importance of RX compliance; instructions for management; Risk factor reduction            Review of Systems   Constitutional: Negative for chills, diaphoresis, fatigue, fever and unexpected weight change  HENT: Negative  Eyes: Negative  Respiratory: Negative for cough, shortness of breath, wheezing and stridor  Cardiovascular: Negative for chest pain, palpitations and leg swelling  Gastrointestinal: Negative for abdominal distention, abdominal pain, constipation, diarrhea, nausea and vomiting  Endocrine: Negative for cold intolerance, heat intolerance and polydipsia  Genitourinary: Negative for difficulty urinating, flank pain and hematuria  Musculoskeletal: Negative for back pain, joint swelling and neck pain  Skin: Positive for wound  Negative for color change and pallor  Allergic/Immunologic: Negative for environmental allergies and food allergies  Neurological: Negative for dizziness, light-headedness, numbness and headaches  Hematological: Negative for adenopathy  Does not bruise/bleed easily  Psychiatric/Behavioral: Negative for agitation, behavioral problems and confusion           Current Outpatient Medications:     insulin glargine (BASAGLAR KWIKPEN) 100 units/mL injection pen, Inject 20 Units under the skin daily, Disp: 2 pen, Rfl: 3    Insulin Pen Needle 29G X 10MM MISC, by Does not apply route daily, Disp: 100 each, Rfl: 2    metFORMIN (GLUCOPHAGE) 500 mg tablet, Take 1 tablet (500 mg total) by mouth 2 (two) times a day with meals, Disp: 90 tablet, Rfl: 3    rivaroxaban (XARELTO) 15 mg tablet, Take 1 tablet (15 mg total) by mouth 2 (two) times a day with meals for 21 days, Disp: 41 tablet, Rfl: 0    amLODIPine (NORVASC) 10 mg tablet, Take 1 tablet (10 mg total) by mouth daily, Disp: 90 tablet, Rfl: 3    atorvastatin (LIPITOR) 20 mg tablet, Take 1 tablet (20 mg total) by mouth daily (Patient not taking: Reported on 9/24/2019), Disp: 30 tablet, Rfl: 0    lisinopril (ZESTRIL) 10 mg tablet, Take 1 tablet (10 mg total) by mouth daily (Patient not taking: Reported on 9/24/2019), Disp: 30 tablet, Rfl: 0    rivaroxaban (XARELTO) 20 mg tablet, Take 1 tablet (20 mg total) by mouth daily with breakfast This medication will be started after 21 days of taking 15 mg bid, Disp: 90 tablet, Rfl: 1  Past Medical History:   Diagnosis Date    [facility] )     remote, diet controlled    Hypertension      No past surgical history on file    Social History     Socioeconomic History    Marital status: /Civil Union     Spouse name: Not on file    Number of children: Not on file    Years of education: Not on file    Highest education level: Not on file   Occupational History    Not on file   Social Needs    Financial resource strain: Not on file    Food insecurity:     Worry: Not on file     Inability: Not on file    Transportation needs:     Medical: Not on file     Non-medical: Not on file   Tobacco Use    Smoking status: Former Smoker    Smokeless tobacco: Never Used   Substance and Sexual Activity    Alcohol use: Not Currently    Drug use: Not Currently    Sexual activity: Not on file Lifestyle    Physical activity:     Days per week: Not on file     Minutes per session: Not on file    Stress: Not on file   Relationships    Social connections:     Talks on phone: Not on file     Gets together: Not on file     Attends Muslim service: Not on file     Active member of club or organization: Not on file     Attends meetings of clubs or organizations: Not on file     Relationship status: Not on file    Intimate partner violence:     Fear of current or ex partner: Not on file     Emotionally abused: Not on file     Physically abused: Not on file     Forced sexual activity: Not on file   Other Topics Concern    Not on file   Social History Narrative    Not on file     No family history on file  No Known Allergies    Objective     Vitals:    09/26/19 1000   BP: (!) 190/110   Pulse: 100   Temp: 98 2 °F (36 8 °C)   Weight: 105 kg (230 lb 9 6 oz)   Height: 6' 1" (1 854 m)       Physical exam:     GENERAL: NAD   HEENT:  NC/AT, PERRL, EOMI, no scleral icterus  CARDIAC:  RRR, +S1/S2, no S3/S4 heard, no m/g/r  PULMONARY:  CTA B/L, no wheezing/rales/rhonci, non-labored breathing  ABDOMEN:  Soft, NT/ND,no rebound/guarding/rigidity  Extremities: Maris Janelle Mild edema right lower extremity, soft, no tenderness to palpation, healing traumatic wound on shin, non erythematous with healing scab no cyanosis, or clubbing  NEUROLOGIC: Grossly intact  SKIN:  No rashes or erythema noted on exposed skin  Psych: Normal affect    DO Bri Cuellar 73 Internal Medicine PGY-1    ==  PLEASE NOTE:  This encounter was completed utilizing the [employer] Direct Speech Voice Recognition Software  Grammatical errors, random word insertions, pronoun errors and incomplete sentences are occasional consequences of the system due to software limitations, ambient noise and hardware issues  These may be missed by proof reading prior to affixing electronic signature   Any questions or concerns about the content, text or information contained within the body of this dictation should be directly addressed to the physician for clarification  Please do not hesitate to call me directly if you have any any questions or concerns

## 2019-09-26 NOTE — PATIENT INSTRUCTIONS
Continue medications as prescribed  Finish Xarelto 2 times a day for the 21 day course and then will switch to Xarelto 20 mg once a day  Continue insulin and metformin for diabetes control  Will consider taking blood pressure medication and cholesterol medication  Keep track of glucose readings in a log

## 2019-09-26 NOTE — PROGRESS NOTES
TCM Call (since 8/26/2019)     Date and time call was made  9/24/2019 11:09 AM    Hospital care reviewed  Records reviewed    Patient was hospitialized at  [facility]    Date of Admission  09/11/19    Date of discharge  09/13/19    Diagnosis  DVT (deep venous thrombosis)  I82 409    Disposition  Home    Were the patients medications reviewed and updated  Yes    Current Symptoms  None      TCM Call (since 8/26/2019)     Post hospital issues  None    Should patient be enrolled in anticoag monitoring? No    Scheduled for follow up? Yes <img src='C:FILES (X86)    Patients specialists  Other (comment)    Other specialists names  [facility] - patient will call for appt  Other specialists contcat #  [phone]    Did you obtain your prescribed medications  Yes    Do you need help managing your prescriptions or medications  No    Is transportation to your appointment needed  No    I have advised the patient to call PCP with any new or worsening symptoms  Tamara Valera RN    Have you fallen in the last 12 months  No    Interperter language line needed  No    Counseling  Patient    Counseling topics  patient and family education; Importance of RX compliance; instructions for management;  Risk factor reduction

## 2019-10-01 ENCOUNTER — TRANSCRIBE ORDERS (OUTPATIENT)
Dept: INTERNAL MEDICINE CLINIC | Facility: CLINIC | Age: 59
End: 2019-10-01

## 2019-10-01 DIAGNOSIS — I82.409 ACUTE EMBOLISM AND THROMBOSIS OF UNSPECIFIED DEEP VEINS OF UNSPECIFIED LOWER EXTREMITY (HCC): Primary | ICD-10-CM

## 2019-10-04 ENCOUNTER — TELEPHONE (OUTPATIENT)
Dept: INTERNAL MEDICINE CLINIC | Facility: CLINIC | Age: 59
End: 2019-10-04

## 2019-10-04 DIAGNOSIS — R03.0 ELEVATED BLOOD PRESSURE READING: ICD-10-CM

## 2019-10-04 DIAGNOSIS — E11.9 TYPE 2 DIABETES MELLITUS WITHOUT COMPLICATION, WITH LONG-TERM CURRENT USE OF INSULIN (HCC): Primary | ICD-10-CM

## 2019-10-04 DIAGNOSIS — I82.409 DVT (DEEP VENOUS THROMBOSIS) (HCC): ICD-10-CM

## 2019-10-04 DIAGNOSIS — Z79.4 TYPE 2 DIABETES MELLITUS WITHOUT COMPLICATION, WITH LONG-TERM CURRENT USE OF INSULIN (HCC): Primary | ICD-10-CM

## 2019-10-04 DIAGNOSIS — E78.5 HYPERLIPIDEMIA, UNSPECIFIED HYPERLIPIDEMIA TYPE: ICD-10-CM

## 2019-10-04 DIAGNOSIS — E11.9 DIABETES MELLITUS (HCC): ICD-10-CM

## 2019-10-04 PROCEDURE — 4010F ACE/ARB THERAPY RXD/TAKEN: CPT | Performed by: INTERNAL MEDICINE

## 2019-10-04 RX ORDER — ATORVASTATIN CALCIUM 20 MG/1
20 TABLET, FILM COATED ORAL DAILY
Qty: 90 TABLET | Refills: 1 | Status: SHIPPED | OUTPATIENT
Start: 2019-10-04 | End: 2019-11-15

## 2019-10-04 RX ORDER — LISINOPRIL 10 MG/1
10 TABLET ORAL DAILY
Qty: 90 TABLET | Refills: 1 | Status: SHIPPED | OUTPATIENT
Start: 2019-10-04 | End: 2019-11-15

## 2019-10-04 NOTE — TELEPHONE ENCOUNTER
I called pt to inform him that you sent his scripts to the pharmacy  At that time he requested a script for test strips for his Freestyle lite glucometer  Can you please send this to his pharmacy- [facility] in [place]?

## 2019-10-07 ENCOUNTER — TELEPHONE (OUTPATIENT)
Dept: INTERNAL MEDICINE CLINIC | Facility: CLINIC | Age: 59
End: 2019-10-07

## 2019-10-07 DIAGNOSIS — E11.9 TYPE 2 DIABETES MELLITUS WITHOUT COMPLICATION, WITH LONG-TERM CURRENT USE OF INSULIN (HCC): Primary | ICD-10-CM

## 2019-10-07 DIAGNOSIS — Z79.4 TYPE 2 DIABETES MELLITUS WITHOUT COMPLICATION, WITH LONG-TERM CURRENT USE OF INSULIN (HCC): Primary | ICD-10-CM

## 2019-10-07 NOTE — TELEPHONE ENCOUNTER
Patient called in requesting that you please send in [address] and there must be specific directions attached  (glucose test strips were sent , insurance needs brand attached)          Also needs the BD ULTRAFINE PEN NEEDLES 31 G X 8 MM sent in  The pen needles that were originally sent were incorrect  Due to patiens insurance, resident comes up as "not enrolled" so must be an attending to send in

## 2019-11-08 RX ORDER — PEN NEEDLE, DIABETIC 29 G X1/2"
NEEDLE, DISPOSABLE MISCELLANEOUS
COMMUNITY
Start: 2019-10-04 | End: 2020-02-12

## 2019-11-08 RX ORDER — AMLODIPINE BESYLATE 10 MG/1
10 TABLET ORAL DAILY
Refills: 3 | COMMUNITY
Start: 2019-09-26 | End: 2019-11-15

## 2019-11-08 RX ORDER — BLOOD-GLUCOSE METER
KIT MISCELLANEOUS DAILY
Refills: 0 | COMMUNITY
Start: 2019-09-13 | End: 2019-11-15 | Stop reason: SDUPTHER

## 2019-11-08 RX ORDER — LANCETS 28 GAUGE
EACH MISCELLANEOUS DAILY
Refills: 0 | COMMUNITY
Start: 2019-09-13 | End: 2019-11-15 | Stop reason: SDUPTHER

## 2019-11-15 ENCOUNTER — OFFICE VISIT (OUTPATIENT)
Dept: INTERNAL MEDICINE CLINIC | Facility: CLINIC | Age: 59
End: 2019-11-15

## 2019-11-15 VITALS
HEIGHT: 73 IN | DIASTOLIC BLOOD PRESSURE: 90 MMHG | OXYGEN SATURATION: 98 % | WEIGHT: 232.81 LBS | HEART RATE: 95 BPM | TEMPERATURE: 98.2 F | SYSTOLIC BLOOD PRESSURE: 162 MMHG | BODY MASS INDEX: 30.85 KG/M2

## 2019-11-15 DIAGNOSIS — E78.5 HYPERLIPIDEMIA, UNSPECIFIED HYPERLIPIDEMIA TYPE: ICD-10-CM

## 2019-11-15 DIAGNOSIS — I82.4Y1 DEEP VEIN THROMBOSIS (DVT) OF PROXIMAL VEIN OF RIGHT LOWER EXTREMITY, UNSPECIFIED CHRONICITY (HCC): ICD-10-CM

## 2019-11-15 DIAGNOSIS — I10 HYPERTENSION, UNSPECIFIED TYPE: ICD-10-CM

## 2019-11-15 DIAGNOSIS — Z79.4 TYPE 2 DIABETES MELLITUS WITHOUT COMPLICATION, WITH LONG-TERM CURRENT USE OF INSULIN (HCC): Primary | ICD-10-CM

## 2019-11-15 DIAGNOSIS — Z11.59 ENCOUNTER FOR HEPATITIS C SCREENING TEST FOR LOW RISK PATIENT: ICD-10-CM

## 2019-11-15 DIAGNOSIS — E11.9 TYPE 2 DIABETES MELLITUS WITHOUT COMPLICATION, WITH LONG-TERM CURRENT USE OF INSULIN (HCC): Primary | ICD-10-CM

## 2019-11-15 DIAGNOSIS — N52.9 ERECTILE DYSFUNCTION, UNSPECIFIED ERECTILE DYSFUNCTION TYPE: ICD-10-CM

## 2019-11-15 DIAGNOSIS — R03.0 ELEVATED BLOOD PRESSURE READING: ICD-10-CM

## 2019-11-15 DIAGNOSIS — L25.9 CONTACT DERMATITIS, UNSPECIFIED CONTACT DERMATITIS TYPE, UNSPECIFIED TRIGGER: ICD-10-CM

## 2019-11-15 DIAGNOSIS — E11.9 DIABETES MELLITUS (HCC): ICD-10-CM

## 2019-11-15 PROCEDURE — 3008F BODY MASS INDEX DOCD: CPT | Performed by: INTERNAL MEDICINE

## 2019-11-15 PROCEDURE — 4010F ACE/ARB THERAPY RXD/TAKEN: CPT | Performed by: INTERNAL MEDICINE

## 2019-11-15 PROCEDURE — 99213 OFFICE O/P EST LOW 20 MIN: CPT | Performed by: INTERNAL MEDICINE

## 2019-11-15 PROCEDURE — 1036F TOBACCO NON-USER: CPT | Performed by: INTERNAL MEDICINE

## 2019-11-15 RX ORDER — AMLODIPINE BESYLATE 10 MG/1
10 TABLET ORAL DAILY
Qty: 90 TABLET | Refills: 1 | Status: SHIPPED | OUTPATIENT
Start: 2019-11-15 | End: 2020-02-12 | Stop reason: SDUPTHER

## 2019-11-15 RX ORDER — TADALAFIL 5 MG/1
5 TABLET ORAL DAILY PRN
Qty: 10 TABLET | Refills: 0 | Status: SHIPPED | OUTPATIENT
Start: 2019-11-15 | End: 2020-02-11

## 2019-11-15 RX ORDER — BLOOD-GLUCOSE METER
KIT MISCELLANEOUS DAILY
Qty: 100 EACH | Refills: 3 | Status: SHIPPED | OUTPATIENT
Start: 2019-11-15

## 2019-11-15 RX ORDER — LISINOPRIL 10 MG/1
20 TABLET ORAL DAILY
Qty: 90 TABLET | Refills: 1 | Status: SHIPPED | OUTPATIENT
Start: 2019-11-15 | End: 2020-02-12 | Stop reason: SDUPTHER

## 2019-11-15 RX ORDER — CLOTRIMAZOLE AND BETAMETHASONE DIPROPIONATE 10; .64 MG/G; MG/G
CREAM TOPICAL 2 TIMES DAILY
Qty: 30 G | Refills: 0 | Status: SHIPPED | OUTPATIENT
Start: 2019-11-15 | End: 2020-02-12

## 2019-11-15 RX ORDER — LANCETS 28 GAUGE
EACH MISCELLANEOUS DAILY
Qty: 100 EACH | Refills: 3 | Status: SHIPPED | OUTPATIENT
Start: 2019-11-15 | End: 2020-02-12 | Stop reason: SDUPTHER

## 2019-11-15 NOTE — ASSESSMENT & PLAN NOTE
· Patient continuing to take Xarelto every day  · Right leg was more swollen compared to left on examination  · Patient continuing to wear compression stockings  · Patient did have provoked DVT and will need 3-6 months of anticoagulation; likely will need closer to 6 months

## 2019-11-15 NOTE — ASSESSMENT & PLAN NOTE
Lab Results   Component Value Date    HGBA1C 11 4 (H) 09/11/2019   ·  Log of his blood sugars fasting and postprandial  · Fasting average 158  · Postprandial 190-210  · Current regimen  · Metformin 1000 mg b i d  · Will increase Basaglar from 20 units to 25 units daily  · Will recheck hemoglobin A1c at next visit in 1 month  · Will hold off on prandial insulin at this time but may need to begin in the future  · Patient is currently working on exercising and eating healthy    · Patient also given scripts for diabetic equipment refills

## 2019-11-15 NOTE — ASSESSMENT & PLAN NOTE
· Patient states he would not like to take any cholesterol medication at this time  · Advised patient that he should be on a statin, but he still refused

## 2019-11-15 NOTE — PROGRESS NOTES
ASSESSMENT/PLAN:  Problem List Items Addressed This Visit        Endocrine    Diabetes mellitus ([facility] [address] ) - Primary       Lab Results   Component Value Date    HGBA1C 11 4 (H) 09/11/2019 ·    Log of his blood sugars fasting and postprandial  · Fasting average 158  · Postprandial 190-210  · Current regimen  · Metformin 1000 mg b i d  · Will increase Basaglar from 20 units to 25 units daily  · Will recheck hemoglobin A1c at next visit in 1 month  · Will hold off on prandial insulin at this time but may need to begin in the future  · Patient is currently working on exercising and eating healthy  · Patient also given scripts for diabetic equipment refills         Relevant Medications    metFORMIN (GLUCOPHAGE) 500 mg tablet    lisinopril (ZESTRIL) 10 mg tablet    insulin glargine (BASAGLAR KWIKPEN) 100 units/mL injection pen    Lancets (FREESTYLE) lancets    Blood Glucose Monitoring Suppl (FREESTYLE LITE) KEIRA    Other Relevant Orders    Microalbumin / creatinine urine ratio       Cardiovascular and Mediastinum    DVT (deep venous thrombosis) (HCC)     · Patient continuing to take Xarelto every day  · Right leg was more swollen compared to left on examination  · Patient continuing to wear compression stockings  · Patient did have provoked DVT and will need 3-6 months of anticoagulation; likely will need closer to 6 months         HTN (hypertension)     · Patient's blood pressure is ranging from 799 to 016 systolic  · Current blood pressure regimen  · Increased lisinopril from 10 mg to 20 mg daily  · Amlodipine 10 mg daily  · Patient states he is continuing to exercise at this time         Relevant Medications    lisinopril (ZESTRIL) 10 mg tablet    amLODIPine (NORVASC) 10 mg tablet       Musculoskeletal and Integument    Contact dermatitis     · Patient given script  · States this is secondary to contact with household cats           Relevant Medications    clotrimazole-betamethasone (LOTRISONE) 1-0 05 % cream       Other Elevated blood pressure reading    Relevant Medications    lisinopril (ZESTRIL) 10 mg tablet    amLODIPine (NORVASC) 10 mg tablet    Hyperlipidemia     · Patient states he would not like to take any cholesterol medication at this time  · Advised patient that he should be on a statin, but he still refused  Erectile dysfunction     · Patient given script for Cialis 5 mg         Relevant Medications    tadalafil (CIALIS) 5 MG tablet      Other Visit Diagnoses     Encounter for hepatitis C screening test for low risk patient        Relevant Orders    Hepatitis C antibody        Health maintenance  Patient refusing colonoscopy at this time    CHIEF COMPLAINT: follow up HTN and DM    HISTORY OF PRESENT ILLNESS:  28-year-old male with past medical history significant for provoked DVT, diabetes mellitus type 2, hypertension, and hyperlipidemia  Patient states that he has been taking all of his medications regularly  Patient also brought in log of his blood sugars both fasting and postprandial, fasting with an average of 158 and postprandial ranging from 190 to 210  Patient also denies any polyuria polydipsia  In regards to his blood pressure patient also had log of his blood pressure readings which were ranging 515 to 829 systolic  Patient states he is taking amlodipine 10 mg and lisinopril 10 mg daily  Patient had no other complaints on examination and states he is feeling well today  Review of Systems   All other systems reviewed and are negative  OBJECTIVE:  Vitals:    11/15/19 0847   BP: 162/90   BP Location: Left arm   Patient Position: Sitting   Cuff Size: Adult   Pulse: 95   Temp: 98 2 °F (36 8 °C)   TempSrc: Oral   SpO2: 98%   Weight: 106 kg (232 lb 12 9 oz)   Height: 6' 1" (1 854 m)     Physical Exam   Constitutional: He is oriented to person, place, and time  He appears well-developed and well-nourished  No distress  HENT:   Head: Normocephalic and atraumatic     Eyes: Conjunctivae are normal  No scleral icterus  Cardiovascular: Normal rate, regular rhythm and normal heart sounds  Exam reveals no gallop and no friction rub  No murmur heard  Pulmonary/Chest: Effort normal and breath sounds normal  No respiratory distress  He has no wheezes  He has no rales  Abdominal: Soft  Bowel sounds are normal  He exhibits no distension  There is no tenderness  Musculoskeletal: Normal range of motion  He exhibits no edema  Right lower extremity 1+ edema noted  Left lower extremity no edema noted   Neurological: He is alert and oriented to person, place, and time  Skin: Skin is warm  No rash noted  Nursing note and vitals reviewed          Current Outpatient Medications:     amLODIPine (NORVASC) 10 mg tablet, Take 1 tablet (10 mg total) by mouth daily, Disp: 90 tablet, Rfl: 1    Blood Glucose Monitoring Suppl (FREESTYLE LITE) KEIRA, by Other route daily Twice daily, Disp: 100 each, Rfl: 3    glucose blood (FREESTYLE LITE) test strip, Test BID as instructed, Disp: 200 each, Rfl: 1    insulin glargine (BASAGLAR KWIKPEN) 100 units/mL injection pen, Inject 25 Units under the skin daily, Disp: 13 pen, Rfl: 0    Insulin Pen Needle (B-D ULTRAFINE III SHORT PEN) 31G X 8 MM MISC, by Does not apply route daily, Disp: 100 each, Rfl: 1    Lancets (FREESTYLE) lancets, by Other route daily Test, Disp: 100 each, Rfl: 3    lisinopril (ZESTRIL) 10 mg tablet, Take 2 tablets (20 mg total) by mouth daily, Disp: 90 tablet, Rfl: 1    metFORMIN (GLUCOPHAGE) 500 mg tablet, Take 2 tablets (1,000 mg total) by mouth 2 (two) times a day with meals, Disp: 180 tablet, Rfl: 1    rivaroxaban (XARELTO) 20 mg tablet, Take 1 tablet (20 mg total) by mouth daily with breakfast This medication will be started after 21 days of taking 15 mg bid, Disp: 90 tablet, Rfl: 1    BD INSULIN SYRINGE U/F 30G X 1/2" 0 5 ML MISC, , Disp: , Rfl:     clotrimazole-betamethasone (LOTRISONE) 1-0 05 % cream, Apply topically 2 (two) times a day, Disp: 30 g, Rfl: 0    tadalafil (CIALIS) 5 MG tablet, Take 1 tablet (5 mg total) by mouth daily as needed for erectile dysfunction, Disp: 10 tablet, Rfl: 0    Past Medical History:   Diagnosis Date    [facility] )     remote, diet controlled    Hypertension      History reviewed  No pertinent surgical history  Social History     Socioeconomic History    Marital status: /Civil Union     Spouse name: Not on file    Number of children: Not on file    Years of education: Not on file    Highest education level: Not on file   Occupational History    Not on file   Social Needs    Financial resource strain: Not on file    Food insecurity:     Worry: Not on file     Inability: Not on file    Transportation needs:     Medical: Not on file     Non-medical: Not on file   Tobacco Use    Smoking status: Former Smoker    Smokeless tobacco: Never Used   Substance and Sexual Activity    Alcohol use: Not Currently    Drug use: Not Currently    Sexual activity: Not on file   Lifestyle    Physical activity:     Days per week: Not on file     Minutes per session: Not on file    Stress: Not on file   Relationships    Social connections:     Talks on phone: Not on file     Gets together: Not on file     Attends Worship service: Not on file     Active member of club or organization: Not on file     Attends meetings of clubs or organizations: Not on file     Relationship status: Not on file    Intimate partner violence:     Fear of current or ex partner: Not on file     Emotionally abused: Not on file     Physically abused: Not on file     Forced sexual activity: Not on file   Other Topics Concern    Not on file   Social History Narrative    Not on file     History reviewed  No pertinent family history  Aris Neff 15 Internal Medicine PGY-2  [facility]  [address]  [facility]  [address] [facility]  [place], [address] [place] [address]

## 2019-11-15 NOTE — ASSESSMENT & PLAN NOTE
· Patient's blood pressure is ranging from 941 to 726 systolic  · Current blood pressure regimen  · Increased lisinopril from 10 mg to 20 mg daily  · Amlodipine 10 mg daily  · Patient states he is continuing to exercise at this time

## 2020-02-12 ENCOUNTER — OFFICE VISIT (OUTPATIENT)
Dept: INTERNAL MEDICINE CLINIC | Facility: CLINIC | Age: 60
End: 2020-02-12

## 2020-02-12 VITALS
SYSTOLIC BLOOD PRESSURE: 150 MMHG | WEIGHT: 236.77 LBS | BODY MASS INDEX: 31.24 KG/M2 | HEART RATE: 84 BPM | OXYGEN SATURATION: 99 % | DIASTOLIC BLOOD PRESSURE: 90 MMHG | TEMPERATURE: 98 F

## 2020-02-12 DIAGNOSIS — E11.9 TYPE 2 DIABETES MELLITUS WITHOUT COMPLICATION, WITH LONG-TERM CURRENT USE OF INSULIN (HCC): Primary | ICD-10-CM

## 2020-02-12 DIAGNOSIS — Z79.4 TYPE 2 DIABETES MELLITUS WITHOUT COMPLICATION, WITH LONG-TERM CURRENT USE OF INSULIN (HCC): Primary | ICD-10-CM

## 2020-02-12 DIAGNOSIS — E78.5 HYPERLIPIDEMIA, UNSPECIFIED HYPERLIPIDEMIA TYPE: ICD-10-CM

## 2020-02-12 DIAGNOSIS — E11.9 DIABETES MELLITUS (HCC): ICD-10-CM

## 2020-02-12 DIAGNOSIS — R03.0 ELEVATED BLOOD PRESSURE READING: ICD-10-CM

## 2020-02-12 DIAGNOSIS — I10 ESSENTIAL HYPERTENSION: ICD-10-CM

## 2020-02-12 DIAGNOSIS — I82.4Y1 DEEP VEIN THROMBOSIS (DVT) OF PROXIMAL VEIN OF RIGHT LOWER EXTREMITY, UNSPECIFIED CHRONICITY (HCC): ICD-10-CM

## 2020-02-12 LAB — SL AMB POCT HEMOGLOBIN AIC: 7.5 (ref ?–6.5)

## 2020-02-12 PROCEDURE — 83036 HEMOGLOBIN GLYCOSYLATED A1C: CPT | Performed by: INTERNAL MEDICINE

## 2020-02-12 PROCEDURE — 3080F DIAST BP >= 90 MM HG: CPT | Performed by: INTERNAL MEDICINE

## 2020-02-12 PROCEDURE — 3051F HG A1C>EQUAL 7.0%<8.0%: CPT | Performed by: INTERNAL MEDICINE

## 2020-02-12 PROCEDURE — 99214 OFFICE O/P EST MOD 30 MIN: CPT | Performed by: INTERNAL MEDICINE

## 2020-02-12 PROCEDURE — 1036F TOBACCO NON-USER: CPT | Performed by: INTERNAL MEDICINE

## 2020-02-12 PROCEDURE — 4010F ACE/ARB THERAPY RXD/TAKEN: CPT | Performed by: INTERNAL MEDICINE

## 2020-02-12 PROCEDURE — 3077F SYST BP >= 140 MM HG: CPT | Performed by: INTERNAL MEDICINE

## 2020-02-12 RX ORDER — LANCETS 28 GAUGE
EACH MISCELLANEOUS DAILY
Qty: 100 EACH | Refills: 3 | Status: CANCELLED | OUTPATIENT
Start: 2020-02-12

## 2020-02-12 RX ORDER — AMLODIPINE BESYLATE 10 MG/1
10 TABLET ORAL DAILY
Qty: 90 TABLET | Refills: 2
Start: 2020-02-12 | End: 2020-05-27

## 2020-02-12 RX ORDER — AMLODIPINE BESYLATE 10 MG/1
10 TABLET ORAL DAILY
Qty: 90 TABLET | Refills: 1 | Status: CANCELLED | OUTPATIENT
Start: 2020-02-12

## 2020-02-12 RX ORDER — LANCETS 28 GAUGE
EACH MISCELLANEOUS DAILY
Qty: 100 EACH | Refills: 3 | Status: SHIPPED | OUTPATIENT
Start: 2020-02-12 | End: 2020-05-27 | Stop reason: SDUPTHER

## 2020-02-12 RX ORDER — LISINOPRIL 10 MG/1
20 TABLET ORAL DAILY
Qty: 90 TABLET | Refills: 1 | Status: CANCELLED | OUTPATIENT
Start: 2020-02-12

## 2020-02-12 RX ORDER — LISINOPRIL 10 MG/1
20 TABLET ORAL DAILY
Qty: 90 TABLET | Refills: 1 | Status: SHIPPED | OUTPATIENT
Start: 2020-02-12 | End: 2020-04-21

## 2020-02-12 NOTE — PATIENT INSTRUCTIONS
10% - bad control"> 10% - bad control,Hemoglobin A1c (HbA1c) greater than 10% indicating poor diabetic control,Haemoglobin A1c greater than 10% indicating poor diabetic control">   Diabetes Mellitus Type 2 in Adults, Ambulatory Care   GENERAL INFORMATION:   Diabetes mellitus type 2  is a disease that affects how your body uses glucose (sugar)  Insulin helps move sugar out of the blood so it can be used for energy  Normally, when the blood sugar level increases, the pancreas makes more insulin  Type 2 diabetes develops because either the body cannot make enough insulin, or it cannot use the insulin correctly  After many years, your pancreas may stop making insulin  Common symptoms include the following:   · More hunger or thirst than usual     · Frequent urination     · Weight loss without trying     · Blurred vision  Seek immediate care for the following symptoms:   · Severe abdominal pain, or pain that spreads to your back  You may also be vomiting  · Trouble staying awake or focusing    · Shaking or sweating    · Blurred or double vision    · Breath has a fruity, sweet smell    · Breathing is deep and labored, or rapid and shallow    · Heartbeat is fast and weak  Treatment for diabetes mellitus type 2  includes keeping your blood sugar at a normal level  You must eat the right foods, and exercise regularly  You may also need medicine if you cannot control your blood sugar level with nutrition and exercise  Manage diabetes mellitus type 2:   · Check your blood sugar level  You will be taught how to check a small drop of blood in a glucose monitor  Ask your healthcare provider when and how often to check during the day  Ask your healthcare provider what your blood sugar levels should be when you check them  · Keep track of carbohydrates (sugar and starchy foods)  Your blood sugar level can get too high if you eat too many carbohydrates   Your dietitian will help you plan meals and snacks that have the right amount of carbohydrates  · Eat low-fat foods  Some examples are skinless chicken and low-fat milk  · Eat less sodium (salt)  Some examples of high-sodium foods to limit are soy sauce, potato chips, and soup  Do not add salt to food you cook  Limit your use of table salt  · Eat high-fiber foods  Foods that are a good source of fiber include vegetables, whole grain bread, and beans  · Limit alcohol  Alcohol affects your blood sugar level and can make it harder to manage your diabetes  Women should limit alcohol to 1 drink a day  Men should limit alcohol to 2 drinks a day  A drink of alcohol is 12 ounces of beer, 5 ounces of wine, or 1½ ounces of liquor  · Get regular exercise  Exercise can help keep your blood sugar level steady, decrease your risk of heart disease, and help you lose weight  Exercise for at least 30 minutes, 5 days a week  Include muscle strengthening activities 2 days each week  Work with your healthcare provider to create an exercise plan  · Check your feet each day  for injuries or open sores  Ask your healthcare provider for activities you can do if you have an open sore  · Quit smoking  If you smoke, it is never too late to quit  Smoking can worsen the problems that may occur with diabetes  Ask your healthcare provider for information about how to stop smoking if you are having trouble quitting  · Ask about your weight:  Ask healthcare providers if you need to lose weight, and how much to lose  Ask them to help you with a weight loss program  Even a 10 to 15 pound weight loss can help you manage your blood sugar level  · Carry medical alert identification  Wear medical alert jewelry or carry a card that says you have diabetes  Ask your healthcare provider where to get these items  · Ask about vaccines  Diabetes puts you at risk of serious illness if you get the flu, pneumonia, or hepatitis   Ask your healthcare provider if you should get a flu, pneumonia, or hepatitis B vaccine, and when to get the vaccine  Follow up with your healthcare provider as directed:  Write down your questions so you remember to ask them during your visits  CARE AGREEMENT:   You have the right to help plan your care  Learn about your health condition and how it may be treated  Discuss treatment options with your caregivers to decide what care you want to receive  You always have the right to refuse treatment  The above information is an  only  It is not intended as medical advice for individual conditions or treatments  Talk to your doctor, nurse or pharmacist before following any medical regimen to see if it is safe and effective for you  © 2014 [address] is for End User's use only and may not be sold, redistributed or otherwise used for commercial purposes  All illustrations and images included in CareNotes® are the copyrighted property of A D A M , Inc  or [employer]

## 2020-03-01 DIAGNOSIS — E11.9 DIABETES MELLITUS (HCC): ICD-10-CM

## 2020-03-11 DIAGNOSIS — I82.409 DVT (DEEP VENOUS THROMBOSIS) (HCC): ICD-10-CM

## 2020-03-11 RX ORDER — RIVAROXABAN 20 MG/1
TABLET, FILM COATED ORAL
Qty: 90 TABLET | Refills: 1 | OUTPATIENT
Start: 2020-03-11

## 2020-03-18 DIAGNOSIS — E11.9 DIABETES MELLITUS (HCC): ICD-10-CM

## 2020-03-18 NOTE — TELEPHONE ENCOUNTER
Name of medication, dose, quantity and frequency    insulin glargine (BASAGLAR KWIKPEN) 100 units/mL injection pen 25 Units, Daily         Number of refills left:    Amount of medication left:    Pharmacy verified and updated- Yes    Additional information: Patient said pharmacy has none for him  I spoke with the pharmacy they never received the script from February

## 2020-03-26 DIAGNOSIS — E11.9 DIABETES MELLITUS (HCC): ICD-10-CM

## 2020-04-21 DIAGNOSIS — R03.0 ELEVATED BLOOD PRESSURE READING: ICD-10-CM

## 2020-04-21 DIAGNOSIS — E11.9 DIABETES MELLITUS (HCC): ICD-10-CM

## 2020-04-21 RX ORDER — LISINOPRIL 10 MG/1
TABLET ORAL
Qty: 90 TABLET | Refills: 1 | Status: SHIPPED | OUTPATIENT
Start: 2020-04-21 | End: 2020-05-27 | Stop reason: SDUPTHER

## 2020-05-27 ENCOUNTER — TELEMEDICINE (OUTPATIENT)
Dept: INTERNAL MEDICINE CLINIC | Facility: CLINIC | Age: 60
End: 2020-05-27

## 2020-05-27 DIAGNOSIS — R03.0 ELEVATED BLOOD PRESSURE READING: ICD-10-CM

## 2020-05-27 DIAGNOSIS — E11.9 DIABETES MELLITUS (HCC): ICD-10-CM

## 2020-05-27 DIAGNOSIS — I10 HYPERTENSION, UNSPECIFIED TYPE: ICD-10-CM

## 2020-05-27 DIAGNOSIS — Z79.4 TYPE 2 DIABETES MELLITUS WITHOUT COMPLICATION, WITH LONG-TERM CURRENT USE OF INSULIN (HCC): Primary | ICD-10-CM

## 2020-05-27 DIAGNOSIS — E11.9 TYPE 2 DIABETES MELLITUS WITHOUT COMPLICATION, WITH LONG-TERM CURRENT USE OF INSULIN (HCC): Primary | ICD-10-CM

## 2020-05-27 PROCEDURE — 99213 OFFICE O/P EST LOW 20 MIN: CPT | Performed by: INTERNAL MEDICINE

## 2020-05-27 PROCEDURE — 4010F ACE/ARB THERAPY RXD/TAKEN: CPT | Performed by: INTERNAL MEDICINE

## 2020-05-27 RX ORDER — LANCETS 28 GAUGE
EACH MISCELLANEOUS DAILY
Qty: 100 EACH | Refills: 3 | Status: SHIPPED | OUTPATIENT
Start: 2020-05-27

## 2020-05-27 RX ORDER — LISINOPRIL 10 MG/1
10 TABLET ORAL DAILY
Qty: 90 TABLET | Refills: 1 | Status: SHIPPED | OUTPATIENT
Start: 2020-05-27 | End: 2020-10-08 | Stop reason: SDUPTHER

## 2020-06-09 ENCOUNTER — TELEPHONE (OUTPATIENT)
Dept: INTERNAL MEDICINE CLINIC | Facility: CLINIC | Age: 60
End: 2020-06-09

## 2020-06-09 DIAGNOSIS — E11.9 TYPE 2 DIABETES MELLITUS WITHOUT COMPLICATION, UNSPECIFIED WHETHER LONG TERM INSULIN USE (HCC): Primary | ICD-10-CM

## 2020-08-28 DIAGNOSIS — E11.9 DIABETES MELLITUS (HCC): ICD-10-CM

## 2020-10-08 DIAGNOSIS — E11.9 DIABETES MELLITUS (HCC): ICD-10-CM

## 2020-10-08 DIAGNOSIS — R03.0 ELEVATED BLOOD PRESSURE READING: ICD-10-CM

## 2020-10-08 RX ORDER — LISINOPRIL 10 MG/1
10 TABLET ORAL DAILY
Qty: 90 TABLET | Refills: 3 | Status: SHIPPED | OUTPATIENT
Start: 2020-10-08 | End: 2020-10-15

## 2020-10-15 ENCOUNTER — TELEMEDICINE (OUTPATIENT)
Dept: INTERNAL MEDICINE CLINIC | Facility: CLINIC | Age: 60
End: 2020-10-15

## 2020-10-15 VITALS — DIASTOLIC BLOOD PRESSURE: 75 MMHG | SYSTOLIC BLOOD PRESSURE: 122 MMHG

## 2020-10-15 DIAGNOSIS — Z79.4 TYPE 2 DIABETES MELLITUS WITHOUT COMPLICATION, WITH LONG-TERM CURRENT USE OF INSULIN (HCC): Primary | ICD-10-CM

## 2020-10-15 DIAGNOSIS — R03.0 ELEVATED BLOOD PRESSURE READING: ICD-10-CM

## 2020-10-15 DIAGNOSIS — E78.5 HYPERLIPIDEMIA, UNSPECIFIED HYPERLIPIDEMIA TYPE: ICD-10-CM

## 2020-10-15 DIAGNOSIS — E11.9 DIABETES MELLITUS (HCC): ICD-10-CM

## 2020-10-15 DIAGNOSIS — I10 HYPERTENSION, UNSPECIFIED TYPE: ICD-10-CM

## 2020-10-15 DIAGNOSIS — E11.9 TYPE 2 DIABETES MELLITUS WITHOUT COMPLICATION, WITH LONG-TERM CURRENT USE OF INSULIN (HCC): Primary | ICD-10-CM

## 2020-10-15 PROBLEM — N52.9 ERECTILE DYSFUNCTION: Status: RESOLVED | Noted: 2019-11-15 | Resolved: 2020-10-15

## 2020-10-15 PROBLEM — L03.115 CELLULITIS OF RIGHT LEG: Status: RESOLVED | Noted: 2019-09-11 | Resolved: 2020-10-15

## 2020-10-15 PROBLEM — L25.9 CONTACT DERMATITIS: Status: RESOLVED | Noted: 2019-11-15 | Resolved: 2020-10-15

## 2020-10-15 PROCEDURE — 1036F TOBACCO NON-USER: CPT | Performed by: INTERNAL MEDICINE

## 2020-10-15 PROCEDURE — 99213 OFFICE O/P EST LOW 20 MIN: CPT | Performed by: INTERNAL MEDICINE

## 2020-10-15 PROCEDURE — 4010F ACE/ARB THERAPY RXD/TAKEN: CPT | Performed by: INTERNAL MEDICINE

## 2020-10-15 RX ORDER — LISINOPRIL 10 MG/1
20 TABLET ORAL DAILY
Qty: 90 TABLET | Refills: 3 | Status: SHIPPED | OUTPATIENT
Start: 2020-10-15 | End: 2021-04-10 | Stop reason: SDUPTHER

## 2020-11-24 RX ORDER — CLOTRIMAZOLE AND BETAMETHASONE DIPROPIONATE 10; .64 MG/G; MG/G
CREAM TOPICAL
Qty: 30 G | OUTPATIENT
Start: 2020-11-24

## 2020-12-22 DIAGNOSIS — E11.9 DIABETES MELLITUS (HCC): ICD-10-CM

## 2021-02-09 DIAGNOSIS — E11.9 TYPE 2 DIABETES MELLITUS WITHOUT COMPLICATION, UNSPECIFIED WHETHER LONG TERM INSULIN USE (HCC): ICD-10-CM

## 2021-02-10 RX ORDER — INSULIN GLARGINE 100 [IU]/ML
INJECTION, SOLUTION SUBCUTANEOUS
Qty: 5 PEN | Refills: 3 | Status: SHIPPED | OUTPATIENT
Start: 2021-02-10 | End: 2021-07-20

## 2021-02-17 DIAGNOSIS — E11.9 TYPE 2 DIABETES MELLITUS WITHOUT COMPLICATION, WITH LONG-TERM CURRENT USE OF INSULIN (HCC): ICD-10-CM

## 2021-02-17 DIAGNOSIS — Z79.4 TYPE 2 DIABETES MELLITUS WITHOUT COMPLICATION, WITH LONG-TERM CURRENT USE OF INSULIN (HCC): ICD-10-CM

## 2021-02-17 RX ORDER — PEN NEEDLE, DIABETIC 31 GX5/16"
NEEDLE, DISPOSABLE MISCELLANEOUS
Qty: 100 EACH | Refills: 1 | Status: SHIPPED | OUTPATIENT
Start: 2021-02-17 | End: 2021-12-17

## 2021-03-10 DIAGNOSIS — Z23 ENCOUNTER FOR IMMUNIZATION: ICD-10-CM

## 2021-03-25 ENCOUNTER — TELEMEDICINE (OUTPATIENT)
Dept: INTERNAL MEDICINE CLINIC | Facility: CLINIC | Age: 61
End: 2021-03-25

## 2021-03-25 VITALS — DIASTOLIC BLOOD PRESSURE: 80 MMHG | SYSTOLIC BLOOD PRESSURE: 115 MMHG

## 2021-03-25 DIAGNOSIS — Z11.59 ENCOUNTER FOR HEPATITIS C SCREENING TEST FOR LOW RISK PATIENT: ICD-10-CM

## 2021-03-25 DIAGNOSIS — I82.4Y1 DEEP VEIN THROMBOSIS (DVT) OF PROXIMAL VEIN OF RIGHT LOWER EXTREMITY, UNSPECIFIED CHRONICITY (HCC): ICD-10-CM

## 2021-03-25 DIAGNOSIS — Z79.4 TYPE 2 DIABETES MELLITUS WITHOUT COMPLICATION, WITH LONG-TERM CURRENT USE OF INSULIN (HCC): Primary | ICD-10-CM

## 2021-03-25 DIAGNOSIS — E78.5 HYPERLIPIDEMIA, UNSPECIFIED HYPERLIPIDEMIA TYPE: ICD-10-CM

## 2021-03-25 DIAGNOSIS — I10 HYPERTENSION, UNSPECIFIED TYPE: ICD-10-CM

## 2021-03-25 DIAGNOSIS — Z11.4 SCREENING FOR HIV (HUMAN IMMUNODEFICIENCY VIRUS): ICD-10-CM

## 2021-03-25 DIAGNOSIS — E11.9 TYPE 2 DIABETES MELLITUS WITHOUT COMPLICATION, WITH LONG-TERM CURRENT USE OF INSULIN (HCC): Primary | ICD-10-CM

## 2021-03-25 PROCEDURE — 99213 OFFICE O/P EST LOW 20 MIN: CPT | Performed by: INTERNAL MEDICINE

## 2021-03-25 PROCEDURE — 3074F SYST BP LT 130 MM HG: CPT | Performed by: INTERNAL MEDICINE

## 2021-03-25 PROCEDURE — 1036F TOBACCO NON-USER: CPT | Performed by: INTERNAL MEDICINE

## 2021-03-25 PROCEDURE — 3079F DIAST BP 80-89 MM HG: CPT | Performed by: INTERNAL MEDICINE

## 2021-03-25 NOTE — PROGRESS NOTES
Virtual Regular Visit      Assessment/Plan:    Problem List Items Addressed This Visit        Endocrine    Diabetes mellitus ([facility] [address] ) - Primary    Relevant Orders    Ambulatory referral to Ophthalmology    Microalbumin / creatinine urine ratio    HEMOGLOBIN A1C W/ EAG ESTIMATION    Basic metabolic panel       Cardiovascular and Mediastinum    DVT (deep venous thrombosis) (HCC)    HTN (hypertension)       Other    Hyperlipidemia    Relevant Orders    Lipid Panel with Direct LDL reflex      Other Visit Diagnoses     Encounter for hepatitis C screening test for low risk patient        Relevant Orders    Hepatitis C antibody    Screening for HIV (human immunodeficiency virus)        Relevant Orders    HIV 1/2 Antigen/Antibody (4th Generation) w Reflex [facility]        HCM:   Colon cancer screening- patient refusing colonoscopy and fit test at this time  Hep C screening- hep C antibody ordered patient amendable  HIV screening- HIV test ordered patient is amendable  COVID-19 vaccine-patient obtain 2nd shot 3/20, tolerated well without side effects       Reason for visit is   Chief Complaint   Patient presents with    Follow-up        Encounter provider Addy Patel MD    Provider located at [address] [place] [address] [address]  [address] [place] [address]  [phone]      Recent Visits  No visits were found meeting these conditions  Showing recent visits within past 7 days and meeting all other requirements     Today's Visits  Date Type Provider Dept   03/25/21 Telemedicine Addy Patel MD  [facility] today's visits and meeting all other requirements     Future Appointments  No visits were found meeting these conditions  Showing future appointments within next 150 days and meeting all other requirements        The patient was identified by name and date of birth   Nahid Chavez was informed that this is a telemedicine visit and that the visit is being conducted through [employer]jel[employer] Krishnamurthy and patient was informed that this is a secure, HIPAA-compliant platform  He agrees to proceed     My office door was closed  No one else was in the room  He acknowledged consent and understanding of privacy and security of the video platform  The patient has agreed to participate and understands they can discontinue the visit at any time  Patient is aware this is a billable service  Subjective    Patient is 81-year-old male with significant past medical history of hypertension, type 2 diabetes, hyperlipidemia and a provoked right-sided DVT 09/2019 who presents for virtual visit for follow-up of chronic medical conditions  Patient has no acute complaints for today and is doing well  For diabetes, patient has been compliant with metformin 1000 b i d  And glargine 25 units daily  Denies any hypoglycemic episodes  Patient's last A1c of 7 5% on 02/2021  Fasting blood glucose 115-128  Tolerating medications well  For blood pressure, patient taking lisinopril 10 mg daily  Patient's blood pressure today measured at home 115/80, yesterday 125/75  Will continue with current therapy  For hyperlipidemia, patient's last cholesterol panel last year showing an elevated ASCVD score indicative of high intensity statin  Patient has been on atorvastatin in the past with intolerance including myalgias fatigue  Patient states not willing to go back on a statin therapy  Patient has had lifestyle modifications implemented  Discussed with patient and would like to repeat lipid panel to see if any improvement with lifestyle modifications prior to initiating other cholesterol-lowering medication  Patient also amendable to trying vitamin B3 and fish oil although this is not the standard of care for which I discussed with the patient  Patient has received the 2nd shot of COVID-19 vaccine on 03/20/2021  Tolerated well without any issues       Patient refusing screening colonoscopy and fit test at this time  Denies any history of colon cancer, ovarian or endometrial cancer in the family  Denies any weight loss, reduced appetite, night sweats, change in stool caliber, blood in stool  Explained to patient the benefits of performing this screening  He understands and verbalized understanding and wishes to defer at this time  Patient has last smoked in 1999 has quit since then  Ophthalmology referral provided  Patient due for diabetic foot exam on next visit in 3 months  Will obtain routine blood work including a BMP, repeat A1c for next visit, lipid panel, urine microalbumin/creatinine ratio  Past Medical History:   Diagnosis Date    Diabetes mellitus ([facility] [address] )     remote, diet controlled    Hypertension        History reviewed  No pertinent surgical history  Current Outpatient Medications   Medication Sig Dispense Refill    B-D ULTRAFINE III SHORT PEN 31G X 8 MM MISC USE AS DIRECTED DAILY 100 each 1    Basaglar KwikPen 100 units/mL injection pen INJECT 25 UNITS UNDER THE SKIN DAILY 5 pen 3    Blood Glucose Monitoring Suppl (FREESTYLE LITE) KEIRA by Other route daily Twice daily 100 each 3    glucose blood (FREESTYLE LITE) test strip Test BID as instructed 200 each 2    Lancets (FREESTYLE) lancets by Other route daily Test 100 each 3    lisinopril (ZESTRIL) 10 mg tablet Take 2 tablets (20 mg total) by mouth daily 90 tablet 3    metFORMIN (GLUCOPHAGE) 500 mg tablet TAKE 2 TABLETS BY MOUTH TWICE A DAY WITH FOOD 360 tablet 0     No current facility-administered medications for this visit  No Known Allergies    Review of Systems   Constitutional: Positive for unexpected weight change (5 lb weight gain in the last year)  All other systems reviewed and are negative  Video Exam    Vitals:    03/25/21 1425   BP: 115/80   BP Location: Left arm   Patient Position: Sitting   Cuff Size: Standard       Physical Exam  Vitals signs and nursing note reviewed     Constitutional: General: He is not in acute distress  Appearance: Normal appearance  He is not ill-appearing or toxic-appearing  HENT:      Head: Normocephalic and atraumatic  Nose: Nose normal       Mouth/Throat:      Mouth: Mucous membranes are moist    Neck:      Musculoskeletal: Normal range of motion and neck supple  Musculoskeletal: Normal range of motion  Neurological:      Mental Status: He is alert and oriented to person, place, and time  Mental status is at baseline  Psychiatric:         Mood and Affect: Mood normal          Behavior: Behavior normal          Thought Content: Thought content normal          Judgment: Judgment normal           I spent 40 minutes directly with the patient during this visit      VIRTUAL VISIT DISCLAIMER    [address] acknowledges that he has consented to an online visit or consultation  He understands that the online visit is based solely on information provided by him, and that, in the absence of a face-to-face physical evaluation by the physician, the diagnosis he receives is both limited and provisional in terms of accuracy and completeness  This is not intended to replace a full medical face-to-face evaluation by the physician  [id number] understands and accepts these terms

## 2021-04-09 DIAGNOSIS — R03.0 ELEVATED BLOOD PRESSURE READING: ICD-10-CM

## 2021-04-09 DIAGNOSIS — E11.9 DIABETES MELLITUS (HCC): ICD-10-CM

## 2021-04-10 PROCEDURE — 4010F ACE/ARB THERAPY RXD/TAKEN: CPT | Performed by: INTERNAL MEDICINE

## 2021-04-10 RX ORDER — LISINOPRIL 10 MG/1
TABLET ORAL
Qty: 180 TABLET | Refills: 1 | Status: SHIPPED | OUTPATIENT
Start: 2021-04-10 | End: 2021-09-21 | Stop reason: SDUPTHER

## 2021-07-06 DIAGNOSIS — E11.9 DIABETES MELLITUS (HCC): ICD-10-CM

## 2021-07-06 NOTE — TELEPHONE ENCOUNTER
Name of medication, dose, quantity and frequency    Requested Prescriptions     Pending Prescriptions Disp Refills    metFORMIN (GLUCOPHAGE) 500 mg tablet 360 tablet 0     Sig: Take 2 tablets (1,000 mg total) by mouth 2 (two) times a day with meals         Number of refills left:    Amount of medication left:    Pharmacy verified and updated-Yes    Additional information:

## 2021-07-08 NOTE — TELEPHONE ENCOUNTER
Patient called for status of refill  He only has enough for today and tomorrow       Please send ASAP

## 2021-07-20 DIAGNOSIS — E11.9 TYPE 2 DIABETES MELLITUS WITHOUT COMPLICATION, UNSPECIFIED WHETHER LONG TERM INSULIN USE (HCC): ICD-10-CM

## 2021-07-20 RX ORDER — INSULIN GLARGINE 100 [IU]/ML
INJECTION, SOLUTION SUBCUTANEOUS
Qty: 15 ML | Refills: 3 | Status: SHIPPED | OUTPATIENT
Start: 2021-07-20 | End: 2021-11-02 | Stop reason: SDUPTHER

## 2021-08-31 ENCOUNTER — RA CDI HCC (OUTPATIENT)
Dept: OTHER | Facility: HOSPITAL | Age: 61
End: 2021-08-31

## 2021-08-31 NOTE — PROGRESS NOTES
[facility]  coding opportunities       Chart reviewed, no opportunity found: CHART REVIEWED, NO OPPORTUNITY FOUND                        Patients insurance company:  [employer] [facility] (Medicare Advantage and Commercial)

## 2021-09-21 DIAGNOSIS — E11.9 DIABETES MELLITUS (HCC): ICD-10-CM

## 2021-09-21 DIAGNOSIS — R03.0 ELEVATED BLOOD PRESSURE READING: ICD-10-CM

## 2021-09-21 RX ORDER — LISINOPRIL 10 MG/1
20 TABLET ORAL DAILY
Qty: 60 TABLET | Refills: 0 | Status: SHIPPED | OUTPATIENT
Start: 2021-09-21 | End: 2021-10-27

## 2021-10-25 DIAGNOSIS — E11.9 DIABETES MELLITUS (HCC): ICD-10-CM

## 2021-10-25 DIAGNOSIS — R03.0 ELEVATED BLOOD PRESSURE READING: ICD-10-CM

## 2021-10-27 RX ORDER — LISINOPRIL 10 MG/1
TABLET ORAL
Qty: 60 TABLET | Refills: 0 | Status: SHIPPED | OUTPATIENT
Start: 2021-10-27 | End: 2021-11-02 | Stop reason: SDUPTHER

## 2021-11-02 ENCOUNTER — TELEMEDICINE (OUTPATIENT)
Dept: INTERNAL MEDICINE CLINIC | Facility: CLINIC | Age: 61
End: 2021-11-02

## 2021-11-02 VITALS — HEART RATE: 73 BPM | DIASTOLIC BLOOD PRESSURE: 85 MMHG | SYSTOLIC BLOOD PRESSURE: 135 MMHG | TEMPERATURE: 96.5 F

## 2021-11-02 DIAGNOSIS — E11.9 DIABETES MELLITUS (HCC): ICD-10-CM

## 2021-11-02 DIAGNOSIS — I10 HYPERTENSION, UNSPECIFIED TYPE: ICD-10-CM

## 2021-11-02 DIAGNOSIS — Z79.4 TYPE 2 DIABETES MELLITUS WITHOUT COMPLICATION, WITH LONG-TERM CURRENT USE OF INSULIN (HCC): Primary | ICD-10-CM

## 2021-11-02 DIAGNOSIS — Z11.4 SCREENING FOR HIV (HUMAN IMMUNODEFICIENCY VIRUS): ICD-10-CM

## 2021-11-02 DIAGNOSIS — Z00.00 ANNUAL PHYSICAL EXAM: ICD-10-CM

## 2021-11-02 DIAGNOSIS — R03.0 ELEVATED BLOOD PRESSURE READING: ICD-10-CM

## 2021-11-02 DIAGNOSIS — Z11.59 NEED FOR HEPATITIS C SCREENING TEST: ICD-10-CM

## 2021-11-02 DIAGNOSIS — E11.9 TYPE 2 DIABETES MELLITUS WITHOUT COMPLICATION, UNSPECIFIED WHETHER LONG TERM INSULIN USE (HCC): ICD-10-CM

## 2021-11-02 DIAGNOSIS — E11.9 TYPE 2 DIABETES MELLITUS WITHOUT COMPLICATION, WITH LONG-TERM CURRENT USE OF INSULIN (HCC): Primary | ICD-10-CM

## 2021-11-02 PROCEDURE — 3075F SYST BP GE 130 - 139MM HG: CPT | Performed by: INTERNAL MEDICINE

## 2021-11-02 PROCEDURE — 99214 OFFICE O/P EST MOD 30 MIN: CPT | Performed by: INTERNAL MEDICINE

## 2021-11-02 PROCEDURE — 4010F ACE/ARB THERAPY RXD/TAKEN: CPT | Performed by: INTERNAL MEDICINE

## 2021-11-02 PROCEDURE — 1036F TOBACCO NON-USER: CPT | Performed by: INTERNAL MEDICINE

## 2021-11-02 PROCEDURE — 3079F DIAST BP 80-89 MM HG: CPT | Performed by: INTERNAL MEDICINE

## 2021-11-02 PROCEDURE — 3725F SCREEN DEPRESSION PERFORMED: CPT | Performed by: INTERNAL MEDICINE

## 2021-11-02 RX ORDER — INSULIN GLARGINE 100 [IU]/ML
25 INJECTION, SOLUTION SUBCUTANEOUS
Qty: 22.5 ML | Refills: 0 | Status: SHIPPED | OUTPATIENT
Start: 2021-11-02 | End: 2022-01-31

## 2021-11-02 RX ORDER — LISINOPRIL 10 MG/1
20 TABLET ORAL DAILY
Qty: 180 TABLET | Refills: 1 | Status: SHIPPED | OUTPATIENT
Start: 2021-11-02 | End: 2022-06-22

## 2022-06-21 DIAGNOSIS — R03.0 ELEVATED BLOOD PRESSURE READING: ICD-10-CM

## 2022-06-21 DIAGNOSIS — E11.9 DIABETES MELLITUS (HCC): ICD-10-CM

## 2022-06-22 RX ORDER — LISINOPRIL 10 MG/1
TABLET ORAL
Qty: 180 TABLET | Refills: 1 | Status: SHIPPED | OUTPATIENT
Start: 2022-06-22

## 2022-07-12 ENCOUNTER — TELEPHONE (OUTPATIENT)
Dept: DIABETES SERVICES | Facility: OTHER | Age: 62
End: 2022-07-12

## 2023-01-08 DIAGNOSIS — R03.0 ELEVATED BLOOD PRESSURE READING: ICD-10-CM

## 2023-01-08 DIAGNOSIS — E11.9 DIABETES MELLITUS (HCC): ICD-10-CM

## 2023-01-09 RX ORDER — LISINOPRIL 10 MG/1
TABLET ORAL
Qty: 180 TABLET | Refills: 1 | Status: SHIPPED | OUTPATIENT
Start: 2023-01-09

## 2023-03-07 ENCOUNTER — TELEPHONE (OUTPATIENT)
Dept: INTERNAL MEDICINE CLINIC | Facility: CLINIC | Age: 63
End: 2023-03-07

## 2023-03-08 NOTE — TELEPHONE ENCOUNTER
If patient is not continuing care with our office then please send a message to the Care Gap team  If he schedules appt please put diabetes follow up in the appt note with A1C

## 2023-03-13 NOTE — TELEPHONE ENCOUNTER
3rd attempt to reach patient  Called primary contact number  Was not able to leave vm because it said patients voicemail box is not set up

## 2023-03-15 NOTE — TELEPHONE ENCOUNTER
Was able to leave message on voicemail, also left one on wife Madison Silver voicemail to c/b and advise if he is still continuing care with our office   If he is then we need to schedule a diabetes follow up

## 2023-07-15 DIAGNOSIS — R03.0 ELEVATED BLOOD PRESSURE READING: ICD-10-CM

## 2023-07-15 DIAGNOSIS — E11.9 DIABETES MELLITUS (HCC): ICD-10-CM

## 2023-07-17 RX ORDER — LISINOPRIL 10 MG/1
TABLET ORAL
Qty: 180 TABLET | Refills: 1 | OUTPATIENT
Start: 2023-07-17

## 2024-06-17 ENCOUNTER — TELEPHONE (OUTPATIENT)
Dept: INTERNAL MEDICINE CLINIC | Facility: CLINIC | Age: 64
End: 2024-06-17

## 2024-06-17 NOTE — TELEPHONE ENCOUNTER
Left a message to offer a sooner appt with another provider to establish care due to his medication shortage.

## 2024-06-21 PROBLEM — E11.69 HYPERLIPIDEMIA ASSOCIATED WITH TYPE 2 DIABETES MELLITUS  (HCC): Status: ACTIVE | Noted: 2019-09-12

## 2024-06-21 PROBLEM — E11.59 HYPERTENSION ASSOCIATED WITH DIABETES  (HCC): Status: ACTIVE | Noted: 2019-11-15

## 2024-06-21 PROBLEM — I15.2 HYPERTENSION ASSOCIATED WITH DIABETES  (HCC): Status: ACTIVE | Noted: 2019-11-15

## 2024-06-21 PROBLEM — Z86.718 HISTORY OF DVT (DEEP VEIN THROMBOSIS): Status: ACTIVE | Noted: 2019-09-11

## 2024-06-21 NOTE — PROGRESS NOTES
Ambulatory Visit  Name: Milton Brennan      : 1960      MRN: [id number]  Encounter Provider: Cecilia Hernandez DO  Encounter Date: 2024   Encounter department: [facility]    Assessment & Plan   1. Type 2 diabetes mellitus without complication, without long-term current use of insulin (HCC)  Assessment & Plan:  A1c 11.7% -- poorly controlled, likely due to lack of medication. Will restart previous regimen and f/u in 3 months to monitor.   Eye exam collected   Pt defers foot exam  Ordered urine microalbumin/cr ratio with labs as pt unable to provide sample today  Orders:  -     POCT hemoglobin A1c  -     IRIS Diabetic eye exam  -     Insulin Glargine Solostar (Basaglar KwikPen) 100 UNIT/ML SOPN; Inject 0.25 mL (25 Units total) as directed daily at bedtime  -     lisinopril (ZESTRIL) 10 mg tablet; Take 1 tablet (10 mg total) by mouth daily  -     metFORMIN (GLUCOPHAGE) 1000 MG tablet; Take 1 tablet (1,000 mg total) by mouth 2 (two) times a day with meals  -     CBC and differential; Future  -     Comprehensive metabolic panel; Future  -     Lipid panel; Future  -     TSH, 3rd generation with Free T4 reflex; Future  -     Albumin / creatinine urine ratio; Future  2. Hypertension associated with diabetes  (HCC)  Assessment & Plan:  Within goal -- pt would prefer to continue two 10 mg tabs than one 20 mg tab, refill sent  Orders:  -     lisinopril (ZESTRIL) 10 mg tablet; Take 1 tablet (10 mg total) by mouth daily  -     CBC and differential; Future  -     Comprehensive metabolic panel; Future  -     Lipid panel; Future  3. Hyperlipidemia associated with type 2 diabetes mellitus  (HCC)  Assessment & Plan:  Update lipids, would likely benefit from statin  Orders:  -     CBC and differential; Future  -     Comprehensive metabolic panel; Future  -     Lipid panel; Future  4. Healthcare maintenance  5. Screening for prostate cancer  -     PSA, Total Screen; Future    Health Maintenance:  "  HIV, Hep C screening deferred per pt request   CRC DUE -- pt defers   PSA DUE -- ordered   Vaccinations: Pneumo deferred, TDap deferred, Shingles deferred, COVID completed primary + boosters   Encouraged regular physical activity, varied diet, and regular dental/eye exams          History of Present Illness     HPI    Pt presents to establish care.   DM: Was previously on Basaglar 25U + Metformin 1000 mg BID and sugars were well controlled -- ran out of insulin quite awhile ago   HTN: Home BPs \"randomly\"   HLD: Not on statin       Review of Systems   Constitutional:  Negative for unexpected weight change.   HENT:  Negative for congestion, ear pain, rhinorrhea and sore throat.    Eyes:  Positive for visual disturbance (\"sometimes blurry\").   Respiratory:  Negative for cough and shortness of breath.    Cardiovascular:  Negative for chest pain, palpitations and leg swelling.   Gastrointestinal:  Negative for abdominal pain, blood in stool, constipation and diarrhea.   Endocrine: Negative for polyuria.   Genitourinary:  Negative for dysuria and hematuria.   Neurological:  Negative for dizziness and headaches.   Psychiatric/Behavioral:  Negative for sleep disturbance.      Medical History Reviewed by provider this encounter:  Tobacco  Allergies  Meds  Problems  Med Hx  Surg Hx  Fam Hx       Objective     /86   Pulse 72   Temp 97.9 °F (36.6 °C)   Ht 6' 1\" (1.854 m)   Wt 104 kg (229 lb 12.8 oz)   SpO2 97%   BMI 30.32 kg/m²     Physical Exam  Vitals and nursing note reviewed.   Constitutional:       General: He is not in acute distress.     Appearance: Normal appearance. He is well-developed.   HENT:      Head: Normocephalic and atraumatic.      Right Ear: Tympanic membrane, ear canal and external ear normal.      Left Ear: Tympanic membrane, ear canal and external ear normal.      Nose: Nose normal. No rhinorrhea.      Mouth/Throat:      Mouth: Mucous membranes are moist.      Pharynx: No oropharyngeal " exudate or posterior oropharyngeal erythema.   Eyes:      Conjunctiva/sclera: Conjunctivae normal.   Neck:      Thyroid: No thyromegaly.   Cardiovascular:      Rate and Rhythm: Normal rate and regular rhythm.   Pulmonary:      Effort: Pulmonary effort is normal. No respiratory distress.      Breath sounds: Normal breath sounds.   Abdominal:      General: Bowel sounds are normal. There is no distension.      Palpations: Abdomen is soft.      Tenderness: There is no abdominal tenderness.   Musculoskeletal:      Right lower leg: No edema.      Left lower leg: No edema.   Lymphadenopathy:      Cervical: No cervical adenopathy.   Skin:     General: Skin is warm and dry.   Neurological:      Mental Status: He is alert.      Comments: Grossly intact   Psychiatric:         Mood and Affect: Mood normal.       Administrative Statements

## 2024-06-24 ENCOUNTER — OFFICE VISIT (OUTPATIENT)
Dept: FAMILY MEDICINE CLINIC | Facility: CLINIC | Age: 64
End: 2024-06-24
Payer: COMMERCIAL

## 2024-06-24 ENCOUNTER — TELEPHONE (OUTPATIENT)
Dept: FAMILY MEDICINE CLINIC | Facility: CLINIC | Age: 64
End: 2024-06-24

## 2024-06-24 VITALS
BODY MASS INDEX: 30.46 KG/M2 | OXYGEN SATURATION: 97 % | DIASTOLIC BLOOD PRESSURE: 86 MMHG | SYSTOLIC BLOOD PRESSURE: 138 MMHG | HEIGHT: 73 IN | TEMPERATURE: 97.9 F | HEART RATE: 72 BPM | WEIGHT: 229.8 LBS

## 2024-06-24 DIAGNOSIS — E11.69 HYPERLIPIDEMIA ASSOCIATED WITH TYPE 2 DIABETES MELLITUS  (HCC): ICD-10-CM

## 2024-06-24 DIAGNOSIS — I15.2 HYPERTENSION ASSOCIATED WITH DIABETES  (HCC): ICD-10-CM

## 2024-06-24 DIAGNOSIS — Z12.5 SCREENING FOR PROSTATE CANCER: ICD-10-CM

## 2024-06-24 DIAGNOSIS — E78.5 HYPERLIPIDEMIA ASSOCIATED WITH TYPE 2 DIABETES MELLITUS  (HCC): ICD-10-CM

## 2024-06-24 DIAGNOSIS — E11.59 HYPERTENSION ASSOCIATED WITH DIABETES  (HCC): ICD-10-CM

## 2024-06-24 DIAGNOSIS — E11.9 TYPE 2 DIABETES MELLITUS WITHOUT COMPLICATION, WITHOUT LONG-TERM CURRENT USE OF INSULIN (HCC): Primary | ICD-10-CM

## 2024-06-24 DIAGNOSIS — Z00.00 HEALTHCARE MAINTENANCE: ICD-10-CM

## 2024-06-24 PROBLEM — E11.3391: Status: ACTIVE | Noted: 2019-09-12

## 2024-06-24 LAB
LEFT EYE DIABETIC RETINOPATHY: ABNORMAL
LEFT EYE IMAGE QUALITY: ABNORMAL
LEFT EYE MACULAR EDEMA: ABNORMAL
LEFT EYE OTHER RETINOPATHY: ABNORMAL
RIGHT EYE DIABETIC RETINOPATHY: ABNORMAL
RIGHT EYE IMAGE QUALITY: ABNORMAL
RIGHT EYE MACULAR EDEMA: POSITIVE
RIGHT EYE OTHER RETINOPATHY: ABNORMAL
SEVERITY (EYE EXAM): ABNORMAL
SL AMB POCT HEMOGLOBIN AIC: 11.7 (ref ?–6.5)

## 2024-06-24 PROCEDURE — 99214 OFFICE O/P EST MOD 30 MIN: CPT | Performed by: FAMILY MEDICINE

## 2024-06-24 PROCEDURE — 83036 HEMOGLOBIN GLYCOSYLATED A1C: CPT | Performed by: FAMILY MEDICINE

## 2024-06-24 PROCEDURE — 99396 PREV VISIT EST AGE 40-64: CPT | Performed by: FAMILY MEDICINE

## 2024-06-24 RX ORDER — INSULIN GLARGINE 100 [IU]/ML
25 INJECTION, SOLUTION SUBCUTANEOUS
Qty: 15 ML | Refills: 1 | Status: SHIPPED | OUTPATIENT
Start: 2024-06-24

## 2024-06-24 RX ORDER — LISINOPRIL 10 MG/1
10 TABLET ORAL DAILY
Qty: 180 TABLET | Refills: 1 | Status: SHIPPED | OUTPATIENT
Start: 2024-06-24

## 2024-06-24 NOTE — ASSESSMENT & PLAN NOTE
A1c 11.7% -- poorly controlled, likely due to lack of medication. Will restart previous regimen and f/u in 3 months to monitor.   Eye exam collected   Pt defers foot exam  Ordered urine microalbumin/cr ratio with labs as pt unable to provide sample today

## 2024-06-24 NOTE — TELEPHONE ENCOUNTER
"Cecilia Romero, DO  6/24/2024 12:00 PM EDT    \"Please let pt know his eye exam shows diabetic changes on the right. It was not able to read the left. I would suggest he schedule a full eye exam soon -- we can give him ponoco eye info if he needs.\"      "

## 2024-08-16 DIAGNOSIS — E11.9 TYPE 2 DIABETES MELLITUS WITHOUT COMPLICATION, WITHOUT LONG-TERM CURRENT USE OF INSULIN (HCC): ICD-10-CM

## 2024-08-16 RX ORDER — INSULIN GLARGINE 100 [IU]/ML
INJECTION, SOLUTION SUBCUTANEOUS
Qty: 22.5 ML | Refills: 1 | Status: SHIPPED | OUTPATIENT
Start: 2024-08-16

## 2024-09-10 ENCOUNTER — APPOINTMENT (OUTPATIENT)
Dept: LAB | Facility: CLINIC | Age: 64
End: 2024-09-10
Payer: COMMERCIAL

## 2024-09-10 DIAGNOSIS — E11.69 HYPERLIPIDEMIA ASSOCIATED WITH TYPE 2 DIABETES MELLITUS  (HCC): ICD-10-CM

## 2024-09-10 DIAGNOSIS — E78.5 HYPERLIPIDEMIA ASSOCIATED WITH TYPE 2 DIABETES MELLITUS  (HCC): ICD-10-CM

## 2024-09-10 DIAGNOSIS — Z12.5 SCREENING FOR PROSTATE CANCER: ICD-10-CM

## 2024-09-10 DIAGNOSIS — I15.2 HYPERTENSION ASSOCIATED WITH DIABETES  (HCC): ICD-10-CM

## 2024-09-10 DIAGNOSIS — E11.59 HYPERTENSION ASSOCIATED WITH DIABETES  (HCC): ICD-10-CM

## 2024-09-10 DIAGNOSIS — E11.9 TYPE 2 DIABETES MELLITUS WITHOUT COMPLICATION, WITHOUT LONG-TERM CURRENT USE OF INSULIN (HCC): ICD-10-CM

## 2024-09-10 LAB
ALBUMIN SERPL BCG-MCNC: 4.6 G/DL (ref 3.5–5)
ALP SERPL-CCNC: 40 U/L (ref 34–104)
ALT SERPL W P-5'-P-CCNC: 15 U/L (ref 7–52)
ANION GAP SERPL CALCULATED.3IONS-SCNC: 8 MMOL/L (ref 4–13)
AST SERPL W P-5'-P-CCNC: 13 U/L (ref 13–39)
BASOPHILS # BLD AUTO: 0.06 THOUSANDS/ÂΜL (ref 0–0.1)
BASOPHILS NFR BLD AUTO: 1 % (ref 0–1)
BILIRUB SERPL-MCNC: 0.37 MG/DL (ref 0.2–1)
BUN SERPL-MCNC: 26 MG/DL (ref 5–25)
CALCIUM SERPL-MCNC: 9.3 MG/DL (ref 8.4–10.2)
CHLORIDE SERPL-SCNC: 102 MMOL/L (ref 96–108)
CHOLEST SERPL-MCNC: 228 MG/DL
CO2 SERPL-SCNC: 25 MMOL/L (ref 21–32)
CREAT SERPL-MCNC: 1.04 MG/DL (ref 0.6–1.3)
CREAT UR-MCNC: 131.4 MG/DL
EOSINOPHIL # BLD AUTO: 0.1 THOUSAND/ÂΜL (ref 0–0.61)
EOSINOPHIL NFR BLD AUTO: 1 % (ref 0–6)
ERYTHROCYTE [DISTWIDTH] IN BLOOD BY AUTOMATED COUNT: 13.3 % (ref 11.6–15.1)
GFR SERPL CREATININE-BSD FRML MDRD: 76 ML/MIN/1.73SQ M
GLUCOSE P FAST SERPL-MCNC: 161 MG/DL (ref 65–99)
HCT VFR BLD AUTO: 44.9 % (ref 36.5–49.3)
HDLC SERPL-MCNC: 50 MG/DL
HGB BLD-MCNC: 14.7 G/DL (ref 12–17)
IMM GRANULOCYTES # BLD AUTO: 0.02 THOUSAND/UL (ref 0–0.2)
IMM GRANULOCYTES NFR BLD AUTO: 0 % (ref 0–2)
LDLC SERPL CALC-MCNC: 144 MG/DL (ref 0–100)
LYMPHOCYTES # BLD AUTO: 2.57 THOUSANDS/ÂΜL (ref 0.6–4.47)
LYMPHOCYTES NFR BLD AUTO: 37 % (ref 14–44)
MCH RBC QN AUTO: 29.2 PG (ref 26.8–34.3)
MCHC RBC AUTO-ENTMCNC: 32.7 G/DL (ref 31.4–37.4)
MCV RBC AUTO: 89 FL (ref 82–98)
MICROALBUMIN UR-MCNC: 28.5 MG/L
MICROALBUMIN/CREAT 24H UR: 22 MG/G CREATININE (ref 0–30)
MONOCYTES # BLD AUTO: 0.65 THOUSAND/ÂΜL (ref 0.17–1.22)
MONOCYTES NFR BLD AUTO: 9 % (ref 4–12)
NEUTROPHILS # BLD AUTO: 3.64 THOUSANDS/ÂΜL (ref 1.85–7.62)
NEUTS SEG NFR BLD AUTO: 52 % (ref 43–75)
NONHDLC SERPL-MCNC: 178 MG/DL
NRBC BLD AUTO-RTO: 0 /100 WBCS
PLATELET # BLD AUTO: 334 THOUSANDS/UL (ref 149–390)
PMV BLD AUTO: 11 FL (ref 8.9–12.7)
POTASSIUM SERPL-SCNC: 4.7 MMOL/L (ref 3.5–5.3)
PROT SERPL-MCNC: 7.4 G/DL (ref 6.4–8.4)
PSA SERPL-MCNC: 1.19 NG/ML (ref 0–4)
RBC # BLD AUTO: 5.03 MILLION/UL (ref 3.88–5.62)
SODIUM SERPL-SCNC: 135 MMOL/L (ref 135–147)
TRIGL SERPL-MCNC: 170 MG/DL
TSH SERPL DL<=0.05 MIU/L-ACNC: 1.54 UIU/ML (ref 0.45–4.5)
WBC # BLD AUTO: 7.04 THOUSAND/UL (ref 4.31–10.16)

## 2024-09-10 PROCEDURE — 84443 ASSAY THYROID STIM HORMONE: CPT

## 2024-09-10 PROCEDURE — 36415 COLL VENOUS BLD VENIPUNCTURE: CPT

## 2024-09-10 PROCEDURE — 80061 LIPID PANEL: CPT

## 2024-09-10 PROCEDURE — 85025 COMPLETE CBC W/AUTO DIFF WBC: CPT

## 2024-09-10 PROCEDURE — 80053 COMPREHEN METABOLIC PANEL: CPT

## 2024-09-10 PROCEDURE — 82043 UR ALBUMIN QUANTITATIVE: CPT

## 2024-09-10 PROCEDURE — G0103 PSA SCREENING: HCPCS

## 2024-09-10 PROCEDURE — 82570 ASSAY OF URINE CREATININE: CPT

## 2024-09-17 ENCOUNTER — RA CDI HCC (OUTPATIENT)
Dept: OTHER | Facility: HOSPITAL | Age: 64
End: 2024-09-17

## 2024-09-23 NOTE — ASSESSMENT & PLAN NOTE
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 Continue current regimen, did discuss that is BPs too low, can consider decreasing Lisinopril to 5 mg.   Lab Results   Component Value Date    HGBA1C 11.7 (A) 06/24/2024

## 2024-09-23 NOTE — PROGRESS NOTES
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 Pt defers any changes in regimen, as he notes it previously took 5-6 months for his A1c to get into the 6s with these medications. Will f/u in 3 months to monitor.   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 Continue current regimen, did discuss that is BPs too low, can consider decreasing Lisinopril to 5 mg.   Lab Results   Component Value Date    HGBA1C 11.7 (A) 2024            Hyperlipidemia associated with type 2 diabetes mellitus  (HCC)  Above goal, pt defers statin. Reviewed lifestyle modifications.   Lab Results   Component Value Date    HGBA1C 11.7 (A) 2024               Pt defers colonoscopy, agreeable to Cologuard    History of Present Illness     HPI    Pt presents for chronic follow up, lab review    DM: Home sugars \"from time to time\", in -160s; denies hypoglycemic symptoms   HTN: Hojme BPs 90-100s/60-70s   HLD: Not on statin     Cr 1.04/GFR 76 (decreased, though last check 5 years ago)  Lipids: Total 228, , HDL 50, ; LFTs WNL   TSH WNL  PSA WNL  CBC benign   Urine albumin elevated      Review of Systems   Constitutional:  Negative for diaphoresis.   Eyes:  Negative for visual disturbance.   Respiratory:  Negative for cough and shortness of breath.    Cardiovascular:  Negative for chest pain, palpitations and leg swelling. "   Gastrointestinal:  Negative for abdominal pain, blood in stool, constipation and diarrhea.   Endocrine: Negative for polyuria.   Genitourinary:  Negative for dysuria and hematuria.   Neurological:  Negative for dizziness, tremors and headaches.     Medical History Reviewed by provider this encounter:           Objective     There were no vitals taken for this visit.    Physical Exam  Vitals and nursing note reviewed.   Constitutional:       General: He is not in acute distress.     Appearance: Normal appearance. He is well-developed.   HENT:      Head: Normocephalic and atraumatic.      Right Ear: Tympanic membrane, ear canal and external ear normal.      Left Ear: Tympanic membrane, ear canal and external ear normal.      Nose: Nose normal. No rhinorrhea.      Mouth/Throat:      Mouth: Mucous membranes are moist.      Pharynx: No oropharyngeal exudate or posterior oropharyngeal erythema.   Eyes:      Conjunctiva/sclera: Conjunctivae normal.   Neck:      Thyroid: No thyromegaly.   Cardiovascular:      Rate and Rhythm: Normal rate and regular rhythm.      Pulses: no weak pulses.           Dorsalis pedis pulses are 2+ on the right side and 2+ on the left side.   Pulmonary:      Effort: Pulmonary effort is normal. No respiratory distress.      Breath sounds: Normal breath sounds.   Abdominal:      General: Bowel sounds are normal. There is no distension.      Palpations: Abdomen is soft.      Tenderness: There is no abdominal tenderness.   Musculoskeletal:      Right lower leg: No edema.      Left lower leg: No edema.   Feet:      Right foot:      Skin integrity: Dry skin present. No callus.      Left foot:      Skin integrity: Dry skin present. No callus.   Lymphadenopathy:      Cervical: No cervical adenopathy.   Skin:     General: Skin is warm and dry.   Neurological:      Mental Status: He is alert.      Comments: Grossly intact   Psychiatric:         Mood and Affect: Mood normal.       Patient's shoes and socks  removed.    Right Foot/Ankle   Right Foot Inspection  Skin Exam: dry skin. No callus and no callus.     Toe Exam: ROM and strength within normal limits.  no right toe deformity    Sensory   Vibration: intact  Monofilament testing: intact    Vascular  Capillary refills: < 3 seconds  The right DP pulse is 2+.     Left Foot/Ankle  Left Foot Inspection  Skin Exam: dry skin. No callus.     Toe Exam: ROM and strength within normal limits. No left toe deformity.     Sensory   Vibration: intact  Monofilament testing: diminished    Vascular  Capillary refills: < 3 seconds  The left DP pulse is 2+.     Assign Risk Category  No deformity present  No loss of protective sensation  No weak pulses  Risk: 0

## 2024-09-23 NOTE — ASSESSMENT & PLAN NOTE
Above goal, pt defers statin. Reviewed lifestyle modifications.   Lab Results   Component Value Date    HGBA1C 11.7 (A) 06/24/2024

## 2024-09-23 NOTE — ASSESSMENT & PLAN NOTE
A1c improved to 9.3%, though still above goal. Pt defers any changes in regimen, as he notes it previously took 5-6 months for his A1c to get into the 6s with these medications. Will f/u in 3 months to monitor.   Foot exam as below   Lab Results   Component Value Date    HGBA1C 11.7 (A) 06/24/2024

## 2024-09-24 ENCOUNTER — OFFICE VISIT (OUTPATIENT)
Dept: FAMILY MEDICINE CLINIC | Facility: CLINIC | Age: 64
End: 2024-09-24
Payer: COMMERCIAL

## 2024-09-24 VITALS
WEIGHT: 241 LBS | OXYGEN SATURATION: 97 % | SYSTOLIC BLOOD PRESSURE: 107 MMHG | BODY MASS INDEX: 31.94 KG/M2 | TEMPERATURE: 97.9 F | DIASTOLIC BLOOD PRESSURE: 70 MMHG | HEART RATE: 80 BPM | HEIGHT: 73 IN

## 2024-09-24 DIAGNOSIS — E78.5 HYPERLIPIDEMIA ASSOCIATED WITH TYPE 2 DIABETES MELLITUS  (HCC): ICD-10-CM

## 2024-09-24 DIAGNOSIS — I15.2 HYPERTENSION ASSOCIATED WITH DIABETES  (HCC): ICD-10-CM

## 2024-09-24 DIAGNOSIS — E11.9 TYPE 2 DIABETES MELLITUS WITHOUT COMPLICATION, WITHOUT LONG-TERM CURRENT USE OF INSULIN (HCC): ICD-10-CM

## 2024-09-24 DIAGNOSIS — Z12.11 COLON CANCER SCREENING: ICD-10-CM

## 2024-09-24 DIAGNOSIS — E11.3391 TYPE 2 DIABETES MELLITUS WITH RIGHT EYE AFFECTED BY MODERATE NONPROLIFERATIVE RETINOPATHY WITHOUT MACULAR EDEMA, WITHOUT LONG-TERM CURRENT USE OF INSULIN (HCC): Primary | ICD-10-CM

## 2024-09-24 DIAGNOSIS — E11.59 HYPERTENSION ASSOCIATED WITH DIABETES  (HCC): ICD-10-CM

## 2024-09-24 DIAGNOSIS — E11.69 HYPERLIPIDEMIA ASSOCIATED WITH TYPE 2 DIABETES MELLITUS  (HCC): ICD-10-CM

## 2024-09-24 LAB — SL AMB POCT HEMOGLOBIN AIC: 9.3 (ref ?–6.5)

## 2024-09-24 PROCEDURE — 99214 OFFICE O/P EST MOD 30 MIN: CPT | Performed by: FAMILY MEDICINE

## 2024-09-24 PROCEDURE — 83036 HEMOGLOBIN GLYCOSYLATED A1C: CPT | Performed by: FAMILY MEDICINE

## 2024-09-24 RX ORDER — BLOOD-GLUCOSE SENSOR
1 EACH MISCELLANEOUS
Qty: 6 EACH | Refills: 3 | Status: SHIPPED | OUTPATIENT
Start: 2024-09-24

## 2024-09-24 RX ORDER — KETOROLAC TROMETHAMINE 30 MG/ML
1 INJECTION, SOLUTION INTRAMUSCULAR; INTRAVENOUS ONCE
Qty: 1 EACH | Refills: 0 | Status: SHIPPED | OUTPATIENT
Start: 2024-09-24 | End: 2024-09-24

## 2024-09-24 RX ORDER — LISINOPRIL 10 MG/1
10 TABLET ORAL DAILY
Qty: 180 TABLET | Refills: 1 | Status: SHIPPED | OUTPATIENT
Start: 2024-09-24

## 2024-09-26 ENCOUNTER — TELEPHONE (OUTPATIENT)
Age: 64
End: 2024-09-26

## 2024-09-26 NOTE — TELEPHONE ENCOUNTER
PA for FreeStyle Wagner 3 Sensor SUBMITTED     via    []CMM-KEY:   [x]Jazz-Case ID # [id number]   []Faxed to plan   []Other website   []Phone call Case ID #     Office notes sent, clinical questions answered. Awaiting determination    Turnaround time for your insurance to make a decision on your Prior Authorization can take 7-21 business days.

## 2024-09-26 NOTE — TELEPHONE ENCOUNTER
[facility] called to notify Dr. Hernandez that the patient's FreeStyle Wagner 3 sensor each has been approved.     Approval is from 9/26/24 until 9/26/25

## 2024-09-27 NOTE — TELEPHONE ENCOUNTER
PA for FreeStyle Wagner 3 Sensor APPROVED     Date(s) approved 09/26/2024-09/26/205    Case #    Patient advised by          [x]KargoCardt Message  [x]Phone call   []LMOM  []L/M to call office as no active Communication consent on file  []Unable to leave detailed message as VM not approved on Communication consent       Pharmacy advised by    [x]Fax  []Phone call    Approval letter scanned into [employer] Yes

## 2024-10-21 DIAGNOSIS — E11.9 TYPE 2 DIABETES MELLITUS WITHOUT COMPLICATION, WITHOUT LONG-TERM CURRENT USE OF INSULIN (HCC): ICD-10-CM

## 2024-10-22 RX ORDER — INSULIN GLARGINE 100 [IU]/ML
INJECTION, SOLUTION SUBCUTANEOUS
Qty: 22.2 ML | Refills: 1 | Status: SHIPPED | OUTPATIENT
Start: 2024-10-22

## 2024-12-15 DIAGNOSIS — E11.9 TYPE 2 DIABETES MELLITUS WITHOUT COMPLICATION, WITHOUT LONG-TERM CURRENT USE OF INSULIN (HCC): ICD-10-CM

## 2024-12-23 DIAGNOSIS — E11.9 TYPE 2 DIABETES MELLITUS WITHOUT COMPLICATION, WITHOUT LONG-TERM CURRENT USE OF INSULIN (HCC): ICD-10-CM

## 2024-12-24 RX ORDER — INSULIN GLARGINE 100 [IU]/ML
INJECTION, SOLUTION SUBCUTANEOUS
Qty: 22.5 ML | Refills: 2 | Status: SHIPPED | OUTPATIENT
Start: 2024-12-24

## 2024-12-30 ENCOUNTER — TELEPHONE (OUTPATIENT)
Age: 64
End: 2024-12-30

## 2024-12-30 DIAGNOSIS — E11.9 TYPE 2 DIABETES MELLITUS WITHOUT COMPLICATION, WITHOUT LONG-TERM CURRENT USE OF INSULIN (HCC): ICD-10-CM

## 2024-12-31 ENCOUNTER — TELEPHONE (OUTPATIENT)
Dept: FAMILY MEDICINE CLINIC | Facility: CLINIC | Age: 64
End: 2024-12-31

## 2024-12-31 ENCOUNTER — TELEPHONE (OUTPATIENT)
Dept: OTHER | Facility: OTHER | Age: 64
End: 2024-12-31

## 2024-12-31 RX ORDER — INSULIN GLARGINE 100 [IU]/ML
INJECTION, SOLUTION SUBCUTANEOUS
Qty: 30 ML | Refills: 1 | Status: SHIPPED | OUTPATIENT
Start: 2024-12-31

## 2024-12-31 NOTE — TELEPHONE ENCOUNTER
PA for Insulin Glargine Solostar (Basaglar SylwiaPen) 100 UNIT/ML SOPN SUBMITTED to     via    [x]CMM-KEY: [id number]  []Surescripts-Case ID #   []Availity-Auth ID # NDC #   []Faxed to plan   []Other website   []Phone call Case ID #     [x]PA sent as URGENT    All office notes, labs and other pertaining documents and studies sent. Clinical questions answered. Awaiting determination from insurance company.     Turnaround time for your insurance to make a decision on your Prior Authorization can take 7-21 business days.

## 2024-12-31 NOTE — TELEPHONE ENCOUNTER
PT medication Insulin Glargine Solostar  has been approved by insurance.       Case number:   [id number]

## 2025-01-06 NOTE — ASSESSMENT & PLAN NOTE
Update lipids prior to next visit, continue lifestyle management   Lab Results   Component Value Date    HGBA1C 7.4 (A) 01/07/2025     Orders:  •  Comprehensive metabolic panel; Future  •  CBC and differential; Future  •  Lipid Panel with Direct LDL reflex; Future

## 2025-01-06 NOTE — PROGRESS NOTES
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, without long-term current use of insulin (HCC)  A1c 7.4% (from 9.3) -- much improved, continue current regimen and lifestyle management  Pt defers eye exam -- is going to schedule with ophtho   Lab Results   Component Value Date    HGBA1C 7.4 (A) 2025     Orders:  •  POCT hemoglobin A1c  •  Hemoglobin A1C; Future  •  Comprehensive metabolic panel; Future  •  CBC and differential; Future  •  Albumin / creatinine urine ratio; Future  •  Lipid Panel with Direct LDL reflex; Future  •  TSH, 3rd generation with Free T4 reflex; Future    Hypertension associated with diabetes  (HCC)  Within goal at home -- continue current regimen   Lab Results   Component Value Date    HGBA1C 7.4 (A) 2025     Orders:  •  Comprehensive metabolic panel; Future  •  CBC and differential; Future  •  Lipid Panel with Direct LDL reflex; Future    Hyperlipidemia associated with type 2 diabetes mellitus  (HCC)  Update lipids prior to next visit, continue lifestyle management   Lab Results   Component Value Date    HGBA1C 7.4 (A) 2025     Orders:  •  Comprehensive metabolic panel; Future  •  CBC and differential; Future  •  Lipid Panel with Direct LDL reflex; Future    Encouraged to complete Cologuard as previously ordered       History of Present Illness     HPI    Pt presents for chronic follow up     DM: Home sugars in good range; denies hypoglycemic symptoms (lowest reading 88; highest 250 after bagel)   HTN: Home BPs 110-120s/70s   HLD: Not on statin     Review of Systems   Eyes:  Negative for visual disturbance.   Respiratory:  Negative for cough and shortness of breath.    Cardiovascular:  Negative for  "chest pain, palpitations and leg swelling.   Gastrointestinal:  Negative for abdominal pain, blood in stool, constipation and diarrhea.   Endocrine: Negative for polyuria.   Genitourinary:  Negative for dysuria and hematuria.   Neurological:  Negative for dizziness and headaches.   Psychiatric/Behavioral:  Negative for sleep disturbance.        Objective   /72 Comment: At home  Pulse 93   Temp (!) 96.6 °F (35.9 °C)   Ht 6' 1\" (1.854 m)   Wt 103 kg (228 lb)   SpO2 98%   BMI 30.08 kg/m²      Physical Exam  Vitals and nursing note reviewed.   Constitutional:       General: He is not in acute distress.     Appearance: Normal appearance. He is well-developed.   HENT:      Head: Normocephalic and atraumatic.      Right Ear: Tympanic membrane, ear canal and external ear normal.      Left Ear: Tympanic membrane, ear canal and external ear normal.      Nose: Nose normal. No rhinorrhea.      Mouth/Throat:      Mouth: Mucous membranes are moist.      Pharynx: No oropharyngeal exudate or posterior oropharyngeal erythema.   Eyes:      Conjunctiva/sclera: Conjunctivae normal.   Neck:      Thyroid: No thyromegaly.   Cardiovascular:      Rate and Rhythm: Normal rate and regular rhythm.   Pulmonary:      Effort: Pulmonary effort is normal. No respiratory distress.      Breath sounds: Normal breath sounds.   Abdominal:      General: Bowel sounds are normal. There is no distension.      Palpations: Abdomen is soft.      Tenderness: There is no abdominal tenderness.   Musculoskeletal:      Right lower leg: No edema.      Left lower leg: No edema.   Lymphadenopathy:      Cervical: No cervical adenopathy.   Skin:     General: Skin is warm and dry.   Neurological:      Mental Status: He is alert.      Comments: Grossly intact   Psychiatric:         Mood and Affect: Mood normal.         "

## 2025-01-06 NOTE — ASSESSMENT & PLAN NOTE
A1c 7.4% (from 9.3) -- much improved, continue current regimen and lifestyle management  Pt defers eye exam -- is going to schedule with ophtho   Lab Results   Component Value Date    HGBA1C 7.4 (A) 01/07/2025     Orders:  •  POCT hemoglobin A1c  •  Hemoglobin A1C; Future  •  Comprehensive metabolic panel; Future  •  CBC and differential; Future  •  Albumin / creatinine urine ratio; Future  •  Lipid Panel with Direct LDL reflex; Future  •  TSH, 3rd generation with Free T4 reflex; Future

## 2025-01-06 NOTE — ASSESSMENT & PLAN NOTE
Within goal at home -- continue current regimen   Lab Results   Component Value Date    HGBA1C 7.4 (A) 01/07/2025     Orders:  •  Comprehensive metabolic panel; Future  •  CBC and differential; Future  •  Lipid Panel with Direct LDL reflex; Future

## 2025-01-07 ENCOUNTER — OFFICE VISIT (OUTPATIENT)
Dept: FAMILY MEDICINE CLINIC | Facility: CLINIC | Age: 65
End: 2025-01-07
Payer: COMMERCIAL

## 2025-01-07 VITALS
SYSTOLIC BLOOD PRESSURE: 117 MMHG | OXYGEN SATURATION: 98 % | TEMPERATURE: 96.6 F | WEIGHT: 228 LBS | DIASTOLIC BLOOD PRESSURE: 72 MMHG | BODY MASS INDEX: 30.22 KG/M2 | HEIGHT: 73 IN | HEART RATE: 93 BPM

## 2025-01-07 DIAGNOSIS — E11.59 HYPERTENSION ASSOCIATED WITH DIABETES  (HCC): ICD-10-CM

## 2025-01-07 DIAGNOSIS — I15.2 HYPERTENSION ASSOCIATED WITH DIABETES  (HCC): ICD-10-CM

## 2025-01-07 DIAGNOSIS — E11.3391 TYPE 2 DIABETES MELLITUS WITH RIGHT EYE AFFECTED BY MODERATE NONPROLIFERATIVE RETINOPATHY WITHOUT MACULAR EDEMA, WITHOUT LONG-TERM CURRENT USE OF INSULIN (HCC): Primary | ICD-10-CM

## 2025-01-07 DIAGNOSIS — E78.5 HYPERLIPIDEMIA ASSOCIATED WITH TYPE 2 DIABETES MELLITUS  (HCC): ICD-10-CM

## 2025-01-07 DIAGNOSIS — E11.69 HYPERLIPIDEMIA ASSOCIATED WITH TYPE 2 DIABETES MELLITUS  (HCC): ICD-10-CM

## 2025-01-07 LAB — SL AMB POCT HEMOGLOBIN AIC: 7.4 (ref ?–6.5)

## 2025-01-07 PROCEDURE — 99214 OFFICE O/P EST MOD 30 MIN: CPT | Performed by: FAMILY MEDICINE

## 2025-01-07 PROCEDURE — 83036 HEMOGLOBIN GLYCOSYLATED A1C: CPT | Performed by: FAMILY MEDICINE

## 2025-01-25 DIAGNOSIS — E11.9 TYPE 2 DIABETES MELLITUS WITHOUT COMPLICATION, WITHOUT LONG-TERM CURRENT USE OF INSULIN (HCC): ICD-10-CM

## 2025-01-27 RX ORDER — INSULIN GLARGINE 100 [IU]/ML
INJECTION, SOLUTION SUBCUTANEOUS
Qty: 45 ML | Refills: 1 | Status: SHIPPED | OUTPATIENT
Start: 2025-01-27

## 2025-04-07 DIAGNOSIS — E11.9 TYPE 2 DIABETES MELLITUS WITHOUT COMPLICATION, WITHOUT LONG-TERM CURRENT USE OF INSULIN (HCC): ICD-10-CM

## 2025-04-07 RX ORDER — INSULIN GLARGINE 100 [IU]/ML
INJECTION, SOLUTION SUBCUTANEOUS
Qty: 45 ML | Refills: 1 | Status: SHIPPED | OUTPATIENT
Start: 2025-04-07

## 2025-05-21 DIAGNOSIS — E11.3391 TYPE 2 DIABETES MELLITUS WITH RIGHT EYE AFFECTED BY MODERATE NONPROLIFERATIVE RETINOPATHY WITHOUT MACULAR EDEMA, WITHOUT LONG-TERM CURRENT USE OF INSULIN (HCC): ICD-10-CM

## 2025-05-21 RX ORDER — ACYCLOVIR 800 MG/1
1 TABLET ORAL
Qty: 6 EACH | Refills: 1 | Status: SHIPPED | OUTPATIENT
Start: 2025-05-21

## 2025-06-06 ENCOUNTER — APPOINTMENT (OUTPATIENT)
Dept: LAB | Facility: CLINIC | Age: 65
End: 2025-06-06
Payer: COMMERCIAL

## 2025-06-06 DIAGNOSIS — E11.3391 TYPE 2 DIABETES MELLITUS WITH RIGHT EYE AFFECTED BY MODERATE NONPROLIFERATIVE RETINOPATHY WITHOUT MACULAR EDEMA, WITHOUT LONG-TERM CURRENT USE OF INSULIN (HCC): ICD-10-CM

## 2025-06-06 DIAGNOSIS — I15.2 HYPERTENSION ASSOCIATED WITH DIABETES  (HCC): ICD-10-CM

## 2025-06-06 DIAGNOSIS — E78.5 HYPERLIPIDEMIA ASSOCIATED WITH TYPE 2 DIABETES MELLITUS  (HCC): ICD-10-CM

## 2025-06-06 DIAGNOSIS — E11.69 HYPERLIPIDEMIA ASSOCIATED WITH TYPE 2 DIABETES MELLITUS  (HCC): ICD-10-CM

## 2025-06-06 DIAGNOSIS — E11.59 HYPERTENSION ASSOCIATED WITH DIABETES  (HCC): ICD-10-CM

## 2025-06-06 LAB
ALBUMIN SERPL BCG-MCNC: 4.5 G/DL (ref 3.5–5)
ALP SERPL-CCNC: 42 U/L (ref 34–104)
ALT SERPL W P-5'-P-CCNC: 12 U/L (ref 7–52)
ANION GAP SERPL CALCULATED.3IONS-SCNC: 7 MMOL/L (ref 4–13)
AST SERPL W P-5'-P-CCNC: 13 U/L (ref 13–39)
BASOPHILS # BLD AUTO: 0.04 THOUSANDS/ÂΜL (ref 0–0.1)
BASOPHILS NFR BLD AUTO: 1 % (ref 0–1)
BILIRUB SERPL-MCNC: 0.45 MG/DL (ref 0.2–1)
BUN SERPL-MCNC: 29 MG/DL (ref 5–25)
CALCIUM SERPL-MCNC: 9.6 MG/DL (ref 8.4–10.2)
CHLORIDE SERPL-SCNC: 107 MMOL/L (ref 96–108)
CHOLEST SERPL-MCNC: 210 MG/DL (ref ?–200)
CO2 SERPL-SCNC: 23 MMOL/L (ref 21–32)
CREAT SERPL-MCNC: 1.08 MG/DL (ref 0.6–1.3)
CREAT UR-MCNC: 71.8 MG/DL
EOSINOPHIL # BLD AUTO: 0.13 THOUSAND/ÂΜL (ref 0–0.61)
EOSINOPHIL NFR BLD AUTO: 2 % (ref 0–6)
ERYTHROCYTE [DISTWIDTH] IN BLOOD BY AUTOMATED COUNT: 13.7 % (ref 11.6–15.1)
EST. AVERAGE GLUCOSE BLD GHB EST-MCNC: 154 MG/DL
GFR SERPL CREATININE-BSD FRML MDRD: 72 ML/MIN/1.73SQ M
GLUCOSE P FAST SERPL-MCNC: 91 MG/DL (ref 65–99)
HBA1C MFR BLD: 7 %
HCT VFR BLD AUTO: 41.8 % (ref 36.5–49.3)
HDLC SERPL-MCNC: 56 MG/DL
HGB BLD-MCNC: 13.3 G/DL (ref 12–17)
IMM GRANULOCYTES # BLD AUTO: 0.03 THOUSAND/UL (ref 0–0.2)
IMM GRANULOCYTES NFR BLD AUTO: 0 % (ref 0–2)
LDLC SERPL CALC-MCNC: 141 MG/DL (ref 0–100)
LYMPHOCYTES # BLD AUTO: 2.51 THOUSANDS/ÂΜL (ref 0.6–4.47)
LYMPHOCYTES NFR BLD AUTO: 37 % (ref 14–44)
MCH RBC QN AUTO: 28.3 PG (ref 26.8–34.3)
MCHC RBC AUTO-ENTMCNC: 31.8 G/DL (ref 31.4–37.4)
MCV RBC AUTO: 89 FL (ref 82–98)
MICROALBUMIN UR-MCNC: 7.3 MG/L
MICROALBUMIN/CREAT 24H UR: 10 MG/G CREATININE (ref 0–30)
MONOCYTES # BLD AUTO: 0.62 THOUSAND/ÂΜL (ref 0.17–1.22)
MONOCYTES NFR BLD AUTO: 9 % (ref 4–12)
NEUTROPHILS # BLD AUTO: 3.49 THOUSANDS/ÂΜL (ref 1.85–7.62)
NEUTS SEG NFR BLD AUTO: 51 % (ref 43–75)
NRBC BLD AUTO-RTO: 0 /100 WBCS
PLATELET # BLD AUTO: 331 THOUSANDS/UL (ref 149–390)
PMV BLD AUTO: 10.6 FL (ref 8.9–12.7)
POTASSIUM SERPL-SCNC: 4.7 MMOL/L (ref 3.5–5.3)
PROT SERPL-MCNC: 7.5 G/DL (ref 6.4–8.4)
RBC # BLD AUTO: 4.7 MILLION/UL (ref 3.88–5.62)
SODIUM SERPL-SCNC: 137 MMOL/L (ref 135–147)
TRIGL SERPL-MCNC: 65 MG/DL (ref ?–150)
TSH SERPL DL<=0.05 MIU/L-ACNC: 0.38 UIU/ML (ref 0.45–4.5)
WBC # BLD AUTO: 6.82 THOUSAND/UL (ref 4.31–10.16)

## 2025-06-06 PROCEDURE — 82043 UR ALBUMIN QUANTITATIVE: CPT

## 2025-06-06 PROCEDURE — 80053 COMPREHEN METABOLIC PANEL: CPT

## 2025-06-06 PROCEDURE — 84443 ASSAY THYROID STIM HORMONE: CPT

## 2025-06-06 PROCEDURE — 84439 ASSAY OF FREE THYROXINE: CPT

## 2025-06-06 PROCEDURE — 83036 HEMOGLOBIN GLYCOSYLATED A1C: CPT

## 2025-06-06 PROCEDURE — 85025 COMPLETE CBC W/AUTO DIFF WBC: CPT

## 2025-06-06 PROCEDURE — 80061 LIPID PANEL: CPT

## 2025-06-06 PROCEDURE — 36415 COLL VENOUS BLD VENIPUNCTURE: CPT

## 2025-06-06 PROCEDURE — 82570 ASSAY OF URINE CREATININE: CPT

## 2025-06-07 LAB — T4 FREE SERPL-MCNC: 0.98 NG/DL (ref 0.61–1.12)

## 2025-06-08 ENCOUNTER — RESULTS FOLLOW-UP (OUTPATIENT)
Dept: FAMILY MEDICINE CLINIC | Facility: CLINIC | Age: 65
End: 2025-06-08

## 2025-06-09 NOTE — PROGRESS NOTES
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, without long-term current use of insulin (HCC)  A1c improved and close to goal -- continue current regimen   Pt defers eye exam -- is going to schedule with Optho     Lab Results   Component Value Date    HGBA1C 7.0 (H) 2025       Orders:  •  metFORMIN (GLUCOPHAGE) 1000 MG tablet; Take 1 tablet (1,000 mg total) by mouth 2 (two) times a day with meals  •  TSH, 3rd generation with Free T4 reflex; Future  •  Lipid Panel with Direct LDL reflex; Future  •  CBC and differential; Future  •  Comprehensive metabolic panel; Future  •  Hemoglobin A1C; Future    Hypertension associated with diabetes  (HCC)  Within goal in office and at home -- continue current regimen   Lab Results   Component Value Date    HGBA1C 7.0 (H) 2025       Orders:  •  Lipid Panel with Direct LDL reflex; Future  •  CBC and differential; Future  •  Comprehensive metabolic panel; Future    Hyperlipidemia associated with type 2 diabetes mellitus  (HCC)  Above goal -- reviewed recommendation for statin in all pt's with diabetes, pt defers. Reviewed sources of LDL to cut back on in diet   Lab Results   Component Value Date    HGBA1C 7.0 (H) 2025       Orders:  •  Lipid Panel with Direct LDL reflex; Future  •  CBC and differential; Future  •  Comprehensive metabolic panel; Future    Screening for prostate cancer    Orders:  •  PSA, Total Screen; Future      Has cologuard at home -- encouraged to complete      History of Present Illness   HPI    Pt presents for chronic follow up, lab review     TSH low/T4 WNL -- likely lab error, will repeat   A1c 7% (from 7.4); microalbumin/cr ratio WNL   Cr 1.08/GFR 72   Lipids:  "Total 210, , HDL 56, TG 65; LFTs WNL   CBC benign         Review of Systems   Eyes:  Negative for visual disturbance.   Respiratory:  Negative for cough and shortness of breath.    Cardiovascular:  Negative for chest pain, palpitations and leg swelling.   Gastrointestinal:  Negative for abdominal pain, blood in stool, constipation and diarrhea.   Endocrine: Negative for polyuria.   Genitourinary:  Negative for dysuria and hematuria.   Neurological:  Negative for dizziness and headaches.       Objective   /70   Pulse 84   Temp 97.9 °F (36.6 °C)   Ht 6' 1\" (1.854 m)   Wt 101 kg (223 lb)   SpO2 97%   BMI 29.42 kg/m²      Physical Exam  Vitals and nursing note reviewed.   Constitutional:       General: He is not in acute distress.     Appearance: Normal appearance. He is well-developed.   HENT:      Head: Normocephalic and atraumatic.      Right Ear: Tympanic membrane, ear canal and external ear normal.      Left Ear: Tympanic membrane, ear canal and external ear normal.      Nose: Nose normal. No rhinorrhea.      Mouth/Throat:      Mouth: Mucous membranes are moist.      Pharynx: No oropharyngeal exudate or posterior oropharyngeal erythema.     Eyes:      Conjunctiva/sclera: Conjunctivae normal.     Neck:      Thyroid: No thyromegaly.     Cardiovascular:      Rate and Rhythm: Normal rate and regular rhythm.   Pulmonary:      Effort: Pulmonary effort is normal. No respiratory distress.      Breath sounds: Normal breath sounds.   Abdominal:      General: Bowel sounds are normal. There is no distension.      Palpations: Abdomen is soft.      Tenderness: There is no abdominal tenderness.     Musculoskeletal:      Right lower leg: No edema.      Left lower leg: No edema.   Lymphadenopathy:      Cervical: No cervical adenopathy.     Skin:     General: Skin is warm and dry.     Neurological:      Mental Status: He is alert.      Comments: Grossly intact   Psychiatric:         Mood and Affect: Mood normal. "

## 2025-06-09 NOTE — ASSESSMENT & PLAN NOTE
Above goal -- reviewed recommendation for statin in all pt's with diabetes, pt defers. Reviewed sources of LDL to cut back on in diet   Lab Results   Component Value Date    HGBA1C 7.0 (H) 06/06/2025       Orders:  •  Lipid Panel with Direct LDL reflex; Future  •  CBC and differential; Future  •  Comprehensive metabolic panel; Future

## 2025-06-09 NOTE — ASSESSMENT & PLAN NOTE
Within goal in office and at home -- continue current regimen   Lab Results   Component Value Date    HGBA1C 7.0 (H) 06/06/2025       Orders:  •  Lipid Panel with Direct LDL reflex; Future  •  CBC and differential; Future  •  Comprehensive metabolic panel; Future

## 2025-06-09 NOTE — ASSESSMENT & PLAN NOTE
A1c improved and close to goal -- continue current regimen   Pt defers eye exam -- is going to schedule with Optho     Lab Results   Component Value Date    HGBA1C 7.0 (H) 06/06/2025       Orders:  •  metFORMIN (GLUCOPHAGE) 1000 MG tablet; Take 1 tablet (1,000 mg total) by mouth 2 (two) times a day with meals  •  TSH, 3rd generation with Free T4 reflex; Future  •  Lipid Panel with Direct LDL reflex; Future  •  CBC and differential; Future  •  Comprehensive metabolic panel; Future  •  Hemoglobin A1C; Future

## 2025-06-10 ENCOUNTER — OFFICE VISIT (OUTPATIENT)
Dept: FAMILY MEDICINE CLINIC | Facility: CLINIC | Age: 65
End: 2025-06-10
Payer: COMMERCIAL

## 2025-06-10 VITALS
SYSTOLIC BLOOD PRESSURE: 138 MMHG | BODY MASS INDEX: 29.55 KG/M2 | HEIGHT: 73 IN | DIASTOLIC BLOOD PRESSURE: 70 MMHG | TEMPERATURE: 97.9 F | OXYGEN SATURATION: 97 % | HEART RATE: 84 BPM | WEIGHT: 223 LBS

## 2025-06-10 DIAGNOSIS — E78.5 HYPERLIPIDEMIA ASSOCIATED WITH TYPE 2 DIABETES MELLITUS  (HCC): ICD-10-CM

## 2025-06-10 DIAGNOSIS — Z12.5 SCREENING FOR PROSTATE CANCER: ICD-10-CM

## 2025-06-10 DIAGNOSIS — E11.59 HYPERTENSION ASSOCIATED WITH DIABETES  (HCC): ICD-10-CM

## 2025-06-10 DIAGNOSIS — E11.69 HYPERLIPIDEMIA ASSOCIATED WITH TYPE 2 DIABETES MELLITUS  (HCC): ICD-10-CM

## 2025-06-10 DIAGNOSIS — I15.2 HYPERTENSION ASSOCIATED WITH DIABETES  (HCC): ICD-10-CM

## 2025-06-10 DIAGNOSIS — E11.3391 TYPE 2 DIABETES MELLITUS WITH RIGHT EYE AFFECTED BY MODERATE NONPROLIFERATIVE RETINOPATHY WITHOUT MACULAR EDEMA, WITHOUT LONG-TERM CURRENT USE OF INSULIN (HCC): Primary | ICD-10-CM

## 2025-06-10 PROCEDURE — 99214 OFFICE O/P EST MOD 30 MIN: CPT | Performed by: FAMILY MEDICINE
